# Patient Record
Sex: FEMALE | Race: WHITE | Employment: OTHER | URBAN - METROPOLITAN AREA
[De-identification: names, ages, dates, MRNs, and addresses within clinical notes are randomized per-mention and may not be internally consistent; named-entity substitution may affect disease eponyms.]

---

## 2020-12-30 ENCOUNTER — HOSPITAL ENCOUNTER (INPATIENT)
Age: 75
LOS: 2 days | Discharge: HOME | DRG: 388 | End: 2021-01-01
Attending: INTERNAL MEDICINE | Admitting: INTERNAL MEDICINE
Payer: MEDICARE

## 2020-12-30 DIAGNOSIS — N18.4 CKD (CHRONIC KIDNEY DISEASE), STAGE IV (HCC): Primary | ICD-10-CM

## 2020-12-30 PROBLEM — I50.9 CHF (CONGESTIVE HEART FAILURE) (HCC): Status: ACTIVE | Noted: 2020-12-30

## 2020-12-30 PROBLEM — K56.609 SBO (SMALL BOWEL OBSTRUCTION) (HCC): Status: ACTIVE | Noted: 2020-12-30

## 2020-12-30 PROBLEM — I89.0 LYMPHEDEMA: Status: ACTIVE | Noted: 2018-09-13

## 2020-12-30 PROBLEM — C18.6 MALIGNANT NEOPLASM OF DESCENDING COLON (HCC): Status: ACTIVE | Noted: 2017-01-25

## 2020-12-30 PROBLEM — Z85.038 H/O MALIGNANT NEOPLASM OF COLON: Status: ACTIVE | Noted: 2020-12-30

## 2020-12-30 PROBLEM — I50.22 CHRONIC SYSTOLIC HEART FAILURE (HCC): Status: ACTIVE | Noted: 2017-03-21

## 2020-12-30 PROBLEM — Z93.3 COLOSTOMY PRESENT (HCC): Status: ACTIVE | Noted: 2017-01-25

## 2020-12-30 LAB — GLUCOSE BLD-MCNC: 94 MG/DL (ref 65–105)

## 2020-12-30 PROCEDURE — 1210000000 HC MED SURG R&B

## 2020-12-30 PROCEDURE — 2580000003 HC RX 258: Performed by: NURSE PRACTITIONER

## 2020-12-30 PROCEDURE — 6360000002 HC RX W HCPCS: Performed by: NURSE PRACTITIONER

## 2020-12-30 PROCEDURE — 2500000003 HC RX 250 WO HCPCS: Performed by: NURSE PRACTITIONER

## 2020-12-30 PROCEDURE — 82947 ASSAY GLUCOSE BLOOD QUANT: CPT

## 2020-12-30 RX ORDER — MORPHINE SULFATE 2 MG/ML
2 INJECTION, SOLUTION INTRAMUSCULAR; INTRAVENOUS EVERY 4 HOURS PRN
Status: DISCONTINUED | OUTPATIENT
Start: 2020-12-30 | End: 2021-01-01

## 2020-12-30 RX ORDER — HEPARIN SODIUM 5000 [USP'U]/ML
5000 INJECTION, SOLUTION INTRAVENOUS; SUBCUTANEOUS EVERY 8 HOURS SCHEDULED
Status: DISCONTINUED | OUTPATIENT
Start: 2020-12-30 | End: 2021-01-01 | Stop reason: HOSPADM

## 2020-12-30 RX ORDER — NICOTINE 21 MG/24HR
1 PATCH, TRANSDERMAL 24 HOURS TRANSDERMAL DAILY PRN
Status: DISCONTINUED | OUTPATIENT
Start: 2020-12-30 | End: 2021-01-01 | Stop reason: HOSPADM

## 2020-12-30 RX ORDER — PROMETHAZINE HYDROCHLORIDE 25 MG/1
12.5 TABLET ORAL EVERY 6 HOURS PRN
Status: DISCONTINUED | OUTPATIENT
Start: 2020-12-30 | End: 2021-01-01 | Stop reason: HOSPADM

## 2020-12-30 RX ORDER — DEXTROSE MONOHYDRATE 25 G/50ML
12.5 INJECTION, SOLUTION INTRAVENOUS PRN
Status: DISCONTINUED | OUTPATIENT
Start: 2020-12-30 | End: 2021-01-01 | Stop reason: HOSPADM

## 2020-12-30 RX ORDER — POLYETHYLENE GLYCOL 3350 17 G/17G
17 POWDER, FOR SOLUTION ORAL DAILY PRN
Status: DISCONTINUED | OUTPATIENT
Start: 2020-12-30 | End: 2021-01-01 | Stop reason: HOSPADM

## 2020-12-30 RX ORDER — ONDANSETRON 2 MG/ML
4 INJECTION INTRAMUSCULAR; INTRAVENOUS EVERY 6 HOURS PRN
Status: DISCONTINUED | OUTPATIENT
Start: 2020-12-30 | End: 2021-01-01 | Stop reason: HOSPADM

## 2020-12-30 RX ORDER — POTASSIUM CHLORIDE 7.45 MG/ML
10 INJECTION INTRAVENOUS PRN
Status: DISCONTINUED | OUTPATIENT
Start: 2020-12-30 | End: 2021-01-01 | Stop reason: HOSPADM

## 2020-12-30 RX ORDER — MAGNESIUM SULFATE 1 G/100ML
1 INJECTION INTRAVENOUS PRN
Status: DISCONTINUED | OUTPATIENT
Start: 2020-12-30 | End: 2021-01-01 | Stop reason: HOSPADM

## 2020-12-30 RX ORDER — POTASSIUM CHLORIDE 20 MEQ/1
40 TABLET, EXTENDED RELEASE ORAL PRN
Status: DISCONTINUED | OUTPATIENT
Start: 2020-12-30 | End: 2021-01-01 | Stop reason: HOSPADM

## 2020-12-30 RX ORDER — NICOTINE POLACRILEX 4 MG
15 LOZENGE BUCCAL PRN
Status: DISCONTINUED | OUTPATIENT
Start: 2020-12-30 | End: 2021-01-01 | Stop reason: HOSPADM

## 2020-12-30 RX ORDER — SODIUM CHLORIDE 0.9 % (FLUSH) 0.9 %
10 SYRINGE (ML) INJECTION EVERY 12 HOURS SCHEDULED
Status: DISCONTINUED | OUTPATIENT
Start: 2020-12-30 | End: 2021-01-01 | Stop reason: HOSPADM

## 2020-12-30 RX ORDER — SODIUM CHLORIDE 0.9 % (FLUSH) 0.9 %
10 SYRINGE (ML) INJECTION PRN
Status: DISCONTINUED | OUTPATIENT
Start: 2020-12-30 | End: 2021-01-01 | Stop reason: HOSPADM

## 2020-12-30 RX ORDER — DEXTROSE MONOHYDRATE 50 MG/ML
100 INJECTION, SOLUTION INTRAVENOUS PRN
Status: DISCONTINUED | OUTPATIENT
Start: 2020-12-30 | End: 2021-01-01 | Stop reason: HOSPADM

## 2020-12-30 RX ORDER — SODIUM CHLORIDE 9 MG/ML
INJECTION, SOLUTION INTRAVENOUS CONTINUOUS
Status: DISCONTINUED | OUTPATIENT
Start: 2020-12-30 | End: 2020-12-31

## 2020-12-30 RX ADMIN — SODIUM CHLORIDE: 9 INJECTION, SOLUTION INTRAVENOUS at 20:28

## 2020-12-30 RX ADMIN — HEPARIN SODIUM 5000 UNITS: 5000 INJECTION INTRAVENOUS; SUBCUTANEOUS at 20:27

## 2020-12-30 RX ADMIN — FAMOTIDINE 20 MG: 10 INJECTION, SOLUTION INTRAVENOUS at 20:27

## 2020-12-30 RX ADMIN — Medication 10 ML: at 20:28

## 2020-12-31 ENCOUNTER — APPOINTMENT (OUTPATIENT)
Dept: GENERAL RADIOLOGY | Age: 75
DRG: 388 | End: 2020-12-31
Attending: INTERNAL MEDICINE
Payer: MEDICARE

## 2020-12-31 ENCOUNTER — APPOINTMENT (OUTPATIENT)
Dept: ULTRASOUND IMAGING | Age: 75
DRG: 388 | End: 2020-12-31
Attending: INTERNAL MEDICINE
Payer: MEDICARE

## 2020-12-31 LAB
ALBUMIN SERPL-MCNC: 3.1 G/DL (ref 3.5–5.2)
ALBUMIN/GLOBULIN RATIO: 1 (ref 1–2.5)
ALP BLD-CCNC: 87 U/L (ref 35–104)
ALT SERPL-CCNC: 61 U/L (ref 5–33)
AMYLASE: 73 U/L (ref 28–100)
ANION GAP SERPL CALCULATED.3IONS-SCNC: 12 MMOL/L (ref 9–17)
AST SERPL-CCNC: 74 U/L
BILIRUB SERPL-MCNC: 0.33 MG/DL (ref 0.3–1.2)
BNP INTERPRETATION: ABNORMAL
BUN BLDV-MCNC: 59 MG/DL (ref 8–23)
BUN/CREAT BLD: ABNORMAL (ref 9–20)
CALCIUM SERPL-MCNC: 9.2 MG/DL (ref 8.6–10.4)
CHLORIDE BLD-SCNC: 113 MMOL/L (ref 98–107)
CO2: 19 MMOL/L (ref 20–31)
CREAT SERPL-MCNC: 3 MG/DL (ref 0.5–0.9)
CREATININE URINE: 129.6 MG/DL (ref 28–217)
GFR AFRICAN AMERICAN: 18 ML/MIN
GFR NON-AFRICAN AMERICAN: 15 ML/MIN
GFR SERPL CREATININE-BSD FRML MDRD: ABNORMAL ML/MIN/{1.73_M2}
GFR SERPL CREATININE-BSD FRML MDRD: ABNORMAL ML/MIN/{1.73_M2}
GLUCOSE BLD-MCNC: 114 MG/DL (ref 65–105)
GLUCOSE BLD-MCNC: 119 MG/DL (ref 65–105)
GLUCOSE BLD-MCNC: 73 MG/DL (ref 65–105)
GLUCOSE BLD-MCNC: 82 MG/DL (ref 65–105)
GLUCOSE BLD-MCNC: 83 MG/DL (ref 70–99)
HCT VFR BLD CALC: 36.7 % (ref 36–46)
HEMOGLOBIN: 10.7 G/DL (ref 12–16)
LIPASE: 28 U/L (ref 13–60)
LV EF: 28 %
LVEF MODALITY: NORMAL
MAGNESIUM: 2.3 MG/DL (ref 1.6–2.6)
MCH RBC QN AUTO: 29.6 PG (ref 26–34)
MCHC RBC AUTO-ENTMCNC: 29.2 G/DL (ref 31–37)
MCV RBC AUTO: 101.4 FL (ref 80–100)
NRBC AUTOMATED: ABNORMAL PER 100 WBC
PDW BLD-RTO: 16.6 % (ref 12.5–15.4)
PHOSPHORUS: 4.3 MG/DL (ref 2.6–4.5)
PLATELET # BLD: 192 K/UL (ref 140–450)
PMV BLD AUTO: 9.3 FL (ref 8–14)
POTASSIUM SERPL-SCNC: 4.8 MMOL/L (ref 3.7–5.3)
PRO-BNP: ABNORMAL PG/ML
RBC # BLD: 3.62 M/UL (ref 4–5.2)
SODIUM BLD-SCNC: 144 MMOL/L (ref 135–144)
SODIUM,UR: 46 MMOL/L
TOTAL PROTEIN, URINE: 66 MG/DL
TOTAL PROTEIN: 6.1 G/DL (ref 6.4–8.3)
WBC # BLD: 6.3 K/UL (ref 3.5–11)

## 2020-12-31 PROCEDURE — APPSS180 APP SPLIT SHARED TIME > 60 MINUTES: Performed by: NURSE PRACTITIONER

## 2020-12-31 PROCEDURE — 84156 ASSAY OF PROTEIN URINE: CPT

## 2020-12-31 PROCEDURE — 6360000002 HC RX W HCPCS: Performed by: NURSE PRACTITIONER

## 2020-12-31 PROCEDURE — 93005 ELECTROCARDIOGRAM TRACING: CPT | Performed by: NURSE PRACTITIONER

## 2020-12-31 PROCEDURE — 84100 ASSAY OF PHOSPHORUS: CPT

## 2020-12-31 PROCEDURE — 83880 ASSAY OF NATRIURETIC PEPTIDE: CPT

## 2020-12-31 PROCEDURE — 36415 COLL VENOUS BLD VENIPUNCTURE: CPT

## 2020-12-31 PROCEDURE — 97535 SELF CARE MNGMENT TRAINING: CPT

## 2020-12-31 PROCEDURE — 2580000003 HC RX 258: Performed by: INTERNAL MEDICINE

## 2020-12-31 PROCEDURE — 85027 COMPLETE CBC AUTOMATED: CPT

## 2020-12-31 PROCEDURE — 97116 GAIT TRAINING THERAPY: CPT

## 2020-12-31 PROCEDURE — 82570 ASSAY OF URINE CREATININE: CPT

## 2020-12-31 PROCEDURE — 99222 1ST HOSP IP/OBS MODERATE 55: CPT | Performed by: INTERNAL MEDICINE

## 2020-12-31 PROCEDURE — 1210000000 HC MED SURG R&B

## 2020-12-31 PROCEDURE — 97162 PT EVAL MOD COMPLEX 30 MIN: CPT

## 2020-12-31 PROCEDURE — 80053 COMPREHEN METABOLIC PANEL: CPT

## 2020-12-31 PROCEDURE — 82947 ASSAY GLUCOSE BLOOD QUANT: CPT

## 2020-12-31 PROCEDURE — 2500000003 HC RX 250 WO HCPCS: Performed by: NURSE PRACTITIONER

## 2020-12-31 PROCEDURE — 2580000003 HC RX 258: Performed by: NURSE PRACTITIONER

## 2020-12-31 PROCEDURE — 83735 ASSAY OF MAGNESIUM: CPT

## 2020-12-31 PROCEDURE — 74018 RADEX ABDOMEN 1 VIEW: CPT

## 2020-12-31 PROCEDURE — 82150 ASSAY OF AMYLASE: CPT

## 2020-12-31 PROCEDURE — 99223 1ST HOSP IP/OBS HIGH 75: CPT | Performed by: INTERNAL MEDICINE

## 2020-12-31 PROCEDURE — 93306 TTE W/DOPPLER COMPLETE: CPT

## 2020-12-31 PROCEDURE — 83690 ASSAY OF LIPASE: CPT

## 2020-12-31 PROCEDURE — 84300 ASSAY OF URINE SODIUM: CPT

## 2020-12-31 PROCEDURE — 76770 US EXAM ABDO BACK WALL COMP: CPT

## 2020-12-31 PROCEDURE — 6370000000 HC RX 637 (ALT 250 FOR IP): Performed by: INTERNAL MEDICINE

## 2020-12-31 PROCEDURE — 97166 OT EVAL MOD COMPLEX 45 MIN: CPT

## 2020-12-31 RX ORDER — MIDODRINE HYDROCHLORIDE 2.5 MG/1
2.5 TABLET ORAL
Status: DISCONTINUED | OUTPATIENT
Start: 2020-12-31 | End: 2021-01-01 | Stop reason: HOSPADM

## 2020-12-31 RX ORDER — ACETAMINOPHEN 500 MG
1000 TABLET ORAL EVERY 6 HOURS PRN
COMMUNITY

## 2020-12-31 RX ORDER — SODIUM CHLORIDE 450 MG/100ML
INJECTION, SOLUTION INTRAVENOUS CONTINUOUS
Status: DISCONTINUED | OUTPATIENT
Start: 2020-12-31 | End: 2020-12-31

## 2020-12-31 RX ORDER — SODIUM CHLORIDE 450 MG/100ML
INJECTION, SOLUTION INTRAVENOUS CONTINUOUS
Status: DISCONTINUED | OUTPATIENT
Start: 2020-12-31 | End: 2021-01-01

## 2020-12-31 RX ORDER — FLUOXETINE 10 MG/1
10 CAPSULE ORAL DAILY
COMMUNITY

## 2020-12-31 RX ADMIN — HEPARIN SODIUM 5000 UNITS: 5000 INJECTION INTRAVENOUS; SUBCUTANEOUS at 21:18

## 2020-12-31 RX ADMIN — Medication 10 ML: at 08:45

## 2020-12-31 RX ADMIN — MIDODRINE HYDROCHLORIDE 2.5 MG: 2.5 TABLET ORAL at 21:19

## 2020-12-31 RX ADMIN — Medication 10 ML: at 21:19

## 2020-12-31 RX ADMIN — FAMOTIDINE 20 MG: 10 INJECTION, SOLUTION INTRAVENOUS at 08:45

## 2020-12-31 RX ADMIN — HEPARIN SODIUM 5000 UNITS: 5000 INJECTION INTRAVENOUS; SUBCUTANEOUS at 12:42

## 2020-12-31 RX ADMIN — SODIUM CHLORIDE: 4.5 INJECTION, SOLUTION INTRAVENOUS at 17:50

## 2020-12-31 RX ADMIN — CEFTRIAXONE SODIUM 1 G: 1 INJECTION, POWDER, FOR SOLUTION INTRAMUSCULAR; INTRAVENOUS at 12:42

## 2020-12-31 RX ADMIN — HEPARIN SODIUM 5000 UNITS: 5000 INJECTION INTRAVENOUS; SUBCUTANEOUS at 05:45

## 2020-12-31 ASSESSMENT — PAIN SCALES - GENERAL: PAINLEVEL_OUTOF10: 0

## 2020-12-31 NOTE — PROGRESS NOTES
Occupational Therapy   Occupational Therapy Initial Assessment  Date: 2020   Patient Name: Aidan Nolan  MRN: [id number]     : 1945    Date of Service: 2020    Discharge Recommendations:  Patient would benefit from continued therapy after discharge       Assessment   Performance deficits / Impairments: Decreased functional mobility ; Decreased safe awareness;Decreased balance;Decreased ADL status; Decreased endurance  Treatment Diagnosis: impaired self care status  Prognosis: Good  Decision Making: Low Complexity  OT Education: OT Role;Plan of Care;ADL Adaptive Strategies;Transfer Training;Equipment;Orientation  Patient Education: safety, activity promotion, ADL participation  REQUIRES OT FOLLOW UP: Yes  Activity Tolerance  Activity Tolerance: Patient Tolerated treatment well  Safety Devices  Safety Devices in place: Yes  Type of devices: Call light within reach; Chair alarm in place; Left in chair;Gait belt;Nurse notified           Patient Diagnosis(es): There were no encounter diagnoses. has a past medical history of Abdominal pain, unspecified site, Back pain, Cancer ([address] Chronic kidney disease, Diabetes ([address] Edema, Hypertension, Nocturia, and SOB (shortness of breath). has a past surgical history that includes Colonoscopy and Upper gastrointestinal endoscopy.     Treatment Diagnosis: impaired self care status      Restrictions  Restrictions/Precautions  Restrictions/Precautions: General Precautions  Implants present? : (pt denies)  Position Activity Restriction  Other position/activity restrictions: up as tolerated    Subjective   General  Chart Reviewed: Orders, Progress Notes, History and Physical  Patient assessed for rehabilitation services?: Yes  Referring Practitioner: PAVAN Epps CNP  Diagnosis: SBO  Subjective  Subjective: \"my back is so stiff from being in the bed\"  General Comment Comments: RN ok'd OT/PT Evaluation this date. Patient pleasant and agreeable to participate  Patient Currently in Pain: No  Pain Assessment  Pain Assessment: 0-10  Pain Level: 0  Vital Signs  Patient Currently in Pain: No  Social/Functional History  Social/Functional History  Lives With: Spouse(2 grandkids)  Type of Home: House  Home Layout: One level  Home Access: Ramped entrance  Bathroom Shower/Tub: Tub/Shower unit  Bathroom Toilet: Standard  Bathroom Equipment: Shower chair  Home Equipment: Kate Brisker, 4 wheeled walker  ADL Assistance: Needs assistance(spouse assists with shower transfer, washing back and hair; spouse assists with socks/shoes, otherwise independent)  Homemaking Assistance: Independent(cooking/cleaning/laundry- shares responsibilites with 23 yr old grandkid)  Ambulation Assistance: Independent(no device)  Transfer Assistance: Independent(no device)  Active : No  Mode of Transportation: SUV  IADL Comments: Sleeps in a recliner chair  Additional Comments: Spouse is home 24/7 and able to assist as needed. Has helpful grandkids - 8 yr old and 23 yr old who assist as needed       Objective   Vision: Within Functional Limits  Hearing: Within functional limits    Orientation  Overall Orientation Status: Within Normal Limits  Orientation Level: Oriented X4     Balance  Sitting Balance: Stand by assistance  Standing Balance: Minimal assistance  Functional Mobility  Functional - Mobility Device: Rolling Walker  Activity: To/From therapy gym; Other(hallway mobility)  Assist Level: Contact guard assistance  Functional Mobility Comments: with rolling walker, visibly became shaky after mobility in hallway and then in bathroom, reports from fatigue  Toilet Transfers  Toilet - Technique: Ambulating(with rolling walker)  Equipment Used: Standard toilet(grab bar on R side)  Toilet Transfer: Contact guard assistance Toilet Transfers Comments: verbal cues to use grab bar instead of walker to pull up from  ADL  Feeding: Setup  Grooming: Setup;Contact guard assistance(hand hygiene after toileting)  UE Bathing: Minimal assistance  LE Bathing: Minimal assistance  UE Dressing: Stand by assistance  LE Dressing: Moderate assistance;Minimal assistance(assist with hospital footies and TEDs this date)  Toileting: Minimal assistance; Moderate assistance(assist with toilet transfer and management of brief)  Tone RUE  RUE Tone: Normotonic  Tone LUE  LUE Tone: Normotonic  Coordination  Movements Are Fluid And Coordinated: Yes  Coordination and Movement description: Tremors     Bed mobility  Supine to Sit: Stand by assistance  Sit to Supine: Unable to assess  Scooting: Stand by assistance  Comment: reports sleeping in a recliner chair at home  Transfers  Sit to stand: Contact guard assistance  Stand to sit: Contact guard assistance  Transfer Comments: from edge of bed     Cognition  Overall Cognitive Status: Exceptions  Safety Judgement: Decreased awareness of need for safety        Sensation  Overall Sensation Status: WFL        LUE AROM (degrees)  LUE AROM : WFL  Left Hand AROM (degrees)  Left Hand AROM: WFL  RUE AROM (degrees)  RUE AROM : WFL  Right Hand AROM (degrees)  Right Hand AROM: WFL  LUE Strength  LUE Strength Comment: grossly 4-/5  RUE Strength  RUE Strength Comment: grossly 4-/5                   Plan   Plan  Times per week: 5-6x/wk  Current Treatment Recommendations: Strengthening, Endurance Training, Patient/Caregiver Education & Training, Equipment Evaluation, Education, & procurement, Self-Care / ADL, Balance Training, Functional Mobility Training, Safety Education & Training      AM-PAC Score        AM-PAC Inpatient Daily Activity Raw Score: 18 (12/31/20 1334)  AM-PAC Inpatient ADL T-Scale Score : 38.66 (12/31/20 1334)  ADL Inpatient CMS 0-100% Score: 46.65 (12/31/20 1334) ADL Inpatient CMS G-Code Modifier : CK (12/31/20 6786)    Goals  Short term goals  Time Frame for Short term goals: in 14 visits, patient will  Short term goal 1: perform functional transfers/mobility during ADL routine with Modified [place] using rolling walker with Good safety  Short term goal 2: increase standing tolerance to 4-5 minutes with UE support as needed during functional task  Short term goal 3: perform BADL tasks with independence/modified independence       Therapy Time   Individual Concurrent Group Co-treatment   Time In 1047         Time Out 1108         Minutes 21         Timed Code Treatment Minutes: 8 Minutes Co Luciano w/ PT       Judith Howard, OT

## 2020-12-31 NOTE — PROGRESS NOTES
Pharmacy Note     Renal Dose Adjustment    Julius Knight is a 66 y.o. female. Pharmacist assessment of renally cleared medications. Recent Labs     12/31/20  0544   BUN 59*       Recent Labs     12/31/20  0544   CREATININE 3.00*       Estimated Creatinine Clearance: 14 mL/min (A) (based on SCr of 3 mg/dL (H)). Estimated CrCl using Ideal Body Weight: 11.3 mL/min (based on IBW 45.5 kg)    Height:   Ht Readings from Last 1 Encounters:   12/30/20 5' (1.524 m)     Weight:  Wt Readings from Last 1 Encounters:   12/31/20 160 lb 11.5 oz (72.9 kg)       The following medication dose has been adjusted based upon renal function per P&T Guidelines:           Order for famotidine 20 mg twice daily changed to famotidine 20 mg once daily.     Fernadna Yoon, PharmD 12/31/2020 8:57 AM

## 2020-12-31 NOTE — H&P
[facility]  Office: [address], DO, Bronwyn Watters, DO, Jaclyn Cisneros, DO, Jakob Harris, DO, Davide Love MD, Pablito Schwartz MD, Lilliana Barlow MD, Sameer Bueno MD, Marquita Cunningham MD, Fay Mei MD, April Mccord MD, Alexia Pedroza MD, Mbonu Mable Nyhan, MD, Gena Scott DO, Soren Pinzon MD, James Reddy MD, Alejandro Carballo DO, Terri Lopez MD,  Ascencion Ray DO, Kb Tracy MD, Harry Guerrero MD, Saúl Muir, [facility], [facility], CNP, Nikita Camargo, CNP, Veronica Estrada, CNS, Nahomy Rand, CNP, Baron Ahumada, CNP, Sonam Stark, CNP, Rosa Elena Chu, CNP, Melanie Duvall, CNP, Dalia Patten PA-C, Jodie Castellon, [facility], Eusebio Castaneda, CNP, Dav Doe, CNP, Kit Gibbs, CNP, Armani Fonseca, CNP, Tony Lee, [facility]   [address] [facility]    HISTORY AND PHYSICAL EXAMINATION            Date:   12/31/2020  Patient name:  Clive Paul  Date of admission:  12/30/2020  5:59 PM  MRN:   [id number]  Account:  [de-identified]  YOB: 1945  PCP:    Jaime Roca MD  Room:   [address] [place] [address]  Code Status:    Full Code    Chief Complaint:     Abdominal pain    History Obtained From:     patient    History of Present Illness:     Clive Paul is a 66 y.o. Unavailable/unknown female who presents with No chief complaint on file. and is admitted to the hospital for the management of SBO (small bowel obstruction) ([facility] Patient presented to [facility] ER related to abdominal pain/cramping nausea vomiting, fever, chills, mild shortness of breath, and decreased activity has been ongoing since about Tuesday. 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 She states typically her stool is really runny but over the last couple days has been thicker but no change in color. She also reports following with Dr. Kim Thomas, with nephrology but hasn't seen him since 2017. She states when she had her initial surgery to remove the cancerous areas in her abdomen she had to have a revision surgery in the colectomy. She is followed with Dr. Bridgett Estrella from [place] [address] for that. Her creatinine was initially 2.2 this morning BUN and creatinine are 59/3.00. I consulted the nephrology group. BNP 11,000 379. Hemoglobin and hematocrit are stable for her. According to care everywhere her normal creatinine is around 2. Her other history includes CHF with an EF reported around 25%. CKD stage IV, dyspnea, essential hypertension, lymphedema, and type 2 diabetes. Dr. Lucía Matos was asked to see the patient related to possible small bowel obstruction per CT. In his opinion she does not have a surgical abdomen. Patient does not require emergent surgery but he would suggest she follow-up with Dr. Bridgett Estrella outpatient for evaluation of parastomal hernia. CT completed at [facility] revealed A large right-sided parastomal hernia is present causing a partial small bowel obstruction. Margaretann Waters is some stool within the small bowel in the region of the hernia.  Inflammation is present around the hernia. This is completed at [facility] was positive for nitrates and 3+ bacteria. Patient was started on Rocephin. Patient denies urinary symptoms. During my assessment she denies nausea and vomiting and is looking forward to having something to eat. Echo ordered prior to Dr. Eartha Boast evaluating patient. Because of her low EF I was concerned about her risk for surgery. According to the echo she has mild left ventricular hypertrophy. Severe global hypokinesis. Global left ventricular function is moderately to severely reduced with an estimated ejection fraction of 25 to 30%. Grade 1 mild left ventricular diastolic dysfunction. No severe valvular disease noted. Past Medical History:     Past Medical History:   Diagnosis Date    Abdominal pain, unspecified site     Back pain     Cancer ([facility]     colon    Chronic kidney disease     [facility]     Edema     Hypertension     Nocturia     SOB (shortness of breath)         Past Surgical History:     Past Surgical History:   Procedure Laterality Date    COLONOSCOPY      UPPER GASTROINTESTINAL ENDOSCOPY          Medications Prior to Admission:     Prior to Admission medications    Medication Sig Start Date End Date Taking?  Authorizing Provider   sacubitril-valsartan (ENTRESTO) 24-26 MG per tablet Take 1 tablet by mouth 2 times daily   Yes Historical Provider, MD   aspirin 81 MG tablet Take 81 mg by mouth daily   Yes Historical Provider, MD   buPROPion (WELLBUTRIN XL) 150 MG extended release tablet Take 150 mg by mouth every morning   Yes Historical Provider, MD   carvedilol (COREG) 3.125 MG tablet Take 3.125 mg by mouth 2 times daily (with meals)   Yes Historical Provider, MD   furosemide (LASIX) 20 MG tablet Take 20 mg by mouth 2 times daily   Yes Historical Provider, MD   glimepiride (AMARYL) 2 MG tablet Take 2 mg by mouth every morning (before breakfast)   Yes Historical Provider, MD   Ferrous Fumarate 325 (106 FE) MG TABS Take 325 mg by mouth 2 times daily   Yes Historical Provider, MD   atorvastatin (LIPITOR) 40 MG tablet Take 40 mg by mouth daily   Yes Historical Provider, MD Potassium Chloride Jill CR (KLOR-CON M20 PO) Take by mouth    Historical Provider, MD        Allergies:     Oxycodone hcl and Tylenol with codeine #3 [acetaminophen-codeine]    Social History:     Tobacco:    reports that she has quit smoking. She has never used smokeless tobacco.  Alcohol:      reports no history of alcohol use. Drug Use:  reports no history of drug use. Family History:     Family History   Problem Relation Age of Onset    Other Mother         cva    Other Father         cva       Review of Systems:     Positive and Negative as described in HPI. Review of Systems   Constitutional: Positive for activity change, appetite change, chills and fever. Negative for diaphoresis. HENT: Negative for congestion and hearing loss. Respiratory: Negative for cough, shortness of breath, wheezing and stridor. Cardiovascular: Negative for chest pain, palpitations and leg swelling. Gastrointestinal: Positive for abdominal pain, nausea and vomiting. Negative for blood in stool, constipation and diarrhea. Genitourinary: Negative for dysuria and frequency. Musculoskeletal: Negative for myalgias. Skin: Negative for rash. Neurological: Negative for dizziness, seizures and headaches. Psychiatric/Behavioral: The patient is not nervous/anxious. Physical Exam:   BP (!) 98/53   Pulse 69   Temp 98.2 °F (36.8 °C) (Oral)   Resp 18   Ht 5' (1.524 m)   Wt 160 lb 11.5 oz (72.9 kg)   SpO2 95%   BMI 31.39 kg/m²   Temp (24hrs), Av °F (36.7 °C), Min:97.6 °F (36.4 °C), Max:98.5 °F (36.9 °C)    Recent Labs     20  0727   POCGLU 94 73       Intake/Output Summary (Last 24 hours) at 2020 0832  Last data filed at 2020 0650  Gross per 24 hour   Intake 467.5 ml   Output 225 ml   Net 242.5 ml       Physical Exam  Vitals signs and nursing note reviewed. Constitutional:       Appearance: Normal appearance.    HENT:      Mouth/Throat: Mouth: Mucous membranes are moist.   Eyes:      Extraocular Movements: Extraocular movements intact. Neck:      Musculoskeletal: Neck supple. Cardiovascular:      Rate and Rhythm: Normal rate and regular rhythm. Pulses: Normal pulses. Heart sounds: Normal heart sounds. Pulmonary:      Effort: Pulmonary effort is normal.      Breath sounds: Examination of the right-upper field reveals decreased breath sounds. Examination of the left-upper field reveals decreased breath sounds. Examination of the right-middle field reveals decreased breath sounds. Examination of the left-middle field reveals decreased breath sounds. Examination of the right-lower field reveals decreased breath sounds. Examination of the left-lower field reveals decreased breath sounds. Decreased breath sounds present. Abdominal:      General: Bowel sounds are normal.      Palpations: Abdomen is soft. Tenderness: There is no abdominal tenderness. Musculoskeletal:      Right lower le+ Edema present. Left lower le+ Edema present. Skin:     General: Skin is warm and dry. Capillary Refill: Capillary refill takes less than 2 seconds. Neurological:      Mental Status: She is alert and oriented to person, place, and time.    Psychiatric:         Mood and Affect: Mood normal.         Investigations:      Laboratory Testing:  Recent Results (from the past 24 hour(s))   POC Glucose Fingerstick    Collection Time: 20  8:53 PM   Result Value Ref Range    POC Glucose 94 65 - 105 mg/dL   Comprehensive Metabolic Panel w/ Reflex to MG    Collection Time: 20  5:44 AM   Result Value Ref Range    Glucose 83 70 - 99 mg/dL    BUN 59 (H) 8 - 23 mg/dL    CREATININE 3.00 (H) 0.50 - 0.90 mg/dL    Bun/Cre Ratio NOT REPORTED 9 - 20    Calcium 9.2 8.6 - 10.4 mg/dL    Sodium 144 135 - 144 mmol/L    Potassium 4.8 3.7 - 5.3 mmol/L    Chloride 113 (H) 98 - 107 mmol/L    CO2 19 (L) 20 - 31 mmol/L Anion Gap 12 9 - 17 mmol/L    Alkaline Phosphatase 87 35 - 104 U/L    ALT 61 (H) 5 - 33 U/L    AST 74 (H) <32 U/L    Total Bilirubin 0.33 0.3 - 1.2 mg/dL    Total Protein 6.1 (L) 6.4 - 8.3 g/dL    Alb 3.1 (L) 3.5 - 5.2 g/dL    Albumin/Globulin Ratio 1.0 1.0 - 2.5    GFR Non-African American 15 (L) >60 mL/min    GFR  18 (L) >60 mL/min    GFR Comment          GFR Staging NOT REPORTED    Magnesium    Collection Time: 12/31/20  5:44 AM   Result Value Ref Range    Magnesium 2.3 1.6 - 2.6 mg/dL   Phosphorus    Collection Time: 12/31/20  5:44 AM   Result Value Ref Range    Phosphorus 4.3 2.6 - 4.5 mg/dL   Amylase    Collection Time: 12/31/20  5:44 AM   Result Value Ref Range    Amylase 73 28 - 100 U/L   Lipase    Collection Time: 12/31/20  5:44 AM   Result Value Ref Range    Lipase 28 13 - 60 U/L   CBC    Collection Time: 12/31/20  5:44 AM   Result Value Ref Range    WBC 6.3 3.5 - 11.0 k/uL    RBC 3.62 (L) 4.0 - 5.2 m/uL    Hemoglobin 10.7 (L) 12.0 - 16.0 g/dL    Hematocrit 36.7 36 - 46 %    .4 (H) 80 - 100 fL    MCH 29.6 26 - 34 pg    MCHC 29.2 (L) 31 - 37 g/dL    RDW 16.6 (H) 12.5 - 15.4 %    Platelets 484 277 - 176 k/uL    MPV 9.3 8.0 - 14.0 fL    NRBC Automated NOT REPORTED per 100 WBC   Brain Natriuretic Peptide    Collection Time: 12/31/20  5:44 AM   Result Value Ref Range    Pro-BNP 11,379 (H) <300 pg/mL    BNP Interpretation Pro-BNP Reference Range:    EKG 12 Lead    Collection Time: 12/31/20  5:55 AM   Result Value Ref Range    Ventricular Rate 63 BPM    Atrial Rate 63 BPM    P-R Interval 158 ms    QRS Duration 150 ms    Q-T Interval 568 ms    QTc Calculation (Bazett) 581 ms    P Axis 43 degrees    R Axis -3 degrees    T Axis -167 degrees   POC Glucose Fingerstick    Collection Time: 12/31/20  7:27 AM   Result Value Ref Range    POC Glucose 73 65 - 105 mg/dL       Imaging/Diagnostics:    Xr Abdomen (kub) (single Ap View)    Result Date: 12/31/2020 Few dilated loops of small bowel in the mid right abdomen with relative paucity of bowel gas. Underlying ileus or obstruction should be considered in the appropriate clinical setting. Consider further evaluation with CT imaging. 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 Fall 2016 (Primary Dx);            CHF (congestive heart failure) ([facility] 12/30/2020 Yes    Overview Signed 12/30/2020  7:17 PM by PAVAN Agrawal CNP     Chronic systolic heart failure EF 25-30%         CKD (chronic kidney disease), stage IV ([facility] [address] [facility] 12/30/2020 Yes    Colostomy present ([facility] 12/30/2020 Yes    Dyspnea 12/30/2020 Yes    Essential hypertension 12/30/2020 Yes    Hyperlipidemia 12/30/2020 Yes    Lower urinary tract infectious disease 12/30/2020 Yes    Lymphedema 12/30/2020 Yes    Obesity 12/30/2020 Yes    Type II diabetes mellitus ([facility] 12/30/2020 Yes          Plan:     Patient status inpatient in the Med/Surge    Small bowel obstruction; general surgery following. Starting clear liquids. Monitoring for stool/flatus. If no nausea and vomiting advance diet as tolerated. Check KUB in a.m. pain medications and antiemetics as needed. PPI    Rocephin for UTI    CKD4 with elevated creatinine on admission; continue gentle IV hydration for now. Nephrology consulted. Monitor I&O. Urine for sodium, creatinine, and protein. Repeat BMP and CBC tomorrow. Renal ultrasound unremarkable    History of diastolic CHF and chronic dyspnea; repeat echo see no change from previous reports. Monitor BNP. Nephrology consulted to help with fluid management. Restart Entresto when tolerating p.o. History of type 2 diabetes; start insulin sliding scale for glucose management. Resume oral medications when able    History of obesity to be managed outpatient by PCP    History of colostomy; monitor output. Skin care as needed    PT/OT to evaluate and treat    History of lymphedema; apply bilateral LEXY hose    Heparin for DVT prophylaxis    Consultations:   IP CONSULT TO GENERAL SURGERY     Patient is admitted as inpatient status because of co-morbidities listed above, severity of signs and symptoms as outlined, requirement for current medical therapies and most importantly because of direct risk to patient if care not provided in a hospital setting. Expected length of stay > 48 hours.     PAVAN Figueroa - CNP  12/31/2020  8:32 AM    Copy sent to Dr. Sean Best MD

## 2020-12-31 NOTE — CARE COORDINATION
Case Management Initial Discharge Plan  Jennifer Garcia             Met with:patient to discuss discharge plans. Information verified: address, contacts, phone number, , insurance Yes    Emergency Contact/Next of Kin name & number: [phone] [facility] [phone]    PCP: Tonia Tomas MD  Date of last visit: past year    Insurance Provider: [employer]    Discharge Planning    Living Arrangements:  Spouse/Significant Other, Family Members   Support Systems:  Spouse/Significant Other, Children, Family Members    Home has 1 stories  ramp stairs to climb to get into front doorLocation of bedroom/bathroom in home 1    Patient able to perform ADL's:Independent    Current Services (outpatient & in home) none  DME equipment: has cane, walker, WC, glucometer  DME provider:     Receiving oral anticoagulation therapy? No    If indicated:   Physician managing anticoagulation treatment:   Where does patient obtain lab work for ATC treatment? Potential Assistance Needed:  N/A    Patient agreeable to home care: No  [place] of choice provided:  no    Prior SNF/Rehab Placement and Facility: [place] [id number] to SNF/Rehab: No  [place] of choice provided: no     Evaluation: no    Expected Discharge date:  21    Patient expects to be discharged to:  Home  Follow Up Appointment: Best Day/ Time:      Transportation provider:   Transportation arrangements needed for discharge: No    Readmission Risk              Risk of Unplanned Readmission:        18             Does patient have a readmission risk score greater than 14?: Yes  If yes, follow-up appointment must be made within 7 days of discharge.      Goals of Care: monitor renal function      Discharge Plan: home with spouse          Electronically signed by Desi Samuel RN on 20 at 3:57 PM EST

## 2020-12-31 NOTE — PROGRESS NOTES
Pt stool is thicker this AM and pt states that she is feeling much better. Pt is now requesting to have something to drink.

## 2020-12-31 NOTE — PROGRESS NOTES
Physical Therapy    Facility/Department: [facility] MED SURG ICU  Initial Assessment    NAME: Oswald Clark  : 1945  MRN: [id number]    Date of Service: 2020    Discharge Recommendations:  Continue to assess pending progress   PT Equipment Recommendations  Equipment Needed: No    Assessment   Body structures, Functions, Activity limitations: Decreased functional mobility ; Decreased balance;Decreased endurance;Decreased strength  Assessment: Pt presents with generalized weakness and decreased activity tolerance. Pt ambulated with RW with ' x 1; transfers CGA. Pt has support at home via spouse and will most likely be able to discharge home. Will assess need for therapy upon discharge pending further progress. Treatment Diagnosis: dec mobililty  Prognosis: Good  Decision Making: Medium Complexity  PT Education: Goals;Transfer Training;PT Role;Plan of Care;General Safety;Gait Training;Functional Mobility Training  REQUIRES PT FOLLOW UP: Yes  Activity Tolerance  Activity Tolerance: Patient Tolerated treatment well       Patient Diagnosis(es): There were no encounter diagnoses. has a past medical history of Abdominal pain, unspecified site, Back pain, Cancer ([address] Chronic kidney disease, Diabetes ([address] Edema, Hypertension, Nocturia, and SOB (shortness of breath). has a past surgical history that includes Colonoscopy and Upper gastrointestinal endoscopy. Restrictions  Restrictions/Precautions  Restrictions/Precautions: General Precautions  Implants present? : (pt denies)  Position Activity Restriction  Other position/activity restrictions: up as tolerated  Vision/Hearing        Subjective  General  Patient assessed for rehabilitation services?: Yes  Family / Caregiver Present: No  Referring Practitioner: Bruno  Referral Date : 20  Diagnosis: SBO  Follows Commands: Within Functional Limits  Subjective  Subjective: Pt supine in bed and agreeable to therapy. Pt with no c/o pain. Pain Screening  Patient Currently in Pain: No  Pain Assessment  Pain Assessment: 0-10  Pain Level: 0  Vital Signs  Patient Currently in Pain: No       Orientation  Orientation  Overall Orientation Status: Within Normal Limits  Social/Functional History  Social/Functional History  Lives With: Spouse(2 grandkids)  Type of Home: House  Home Layout: One level  Home Access: Ramped entrance  Bathroom Shower/Tub: Tub/Shower unit  Bathroom Toilet: Standard  Bathroom Equipment: Shower chair  Home Equipment: [employer], 4 wheeled walker  ADL Assistance: Needs assistance(spouse assists with shower transfer, washing back and hair; spouse assists with socks/shoes, otherwise independent)  Homemaking Assistance: Independent(cooking/cleaning/laundry- shares responsibilites with 23 yr old grandkid)  Ambulation Assistance: Independent(no device)  Transfer Assistance: Independent(no device)  Active : No  Mode of Transportation: [facility]  IADL Comments: Sleeps in a recliner chair  Additional Comments: Spouse is home 24/7 and able to assist as needed. Has helpful grandkids - 8 yr old and 23 yr old who assist as needed  Cognition        Objective          AROM RLE (degrees)  RLE AROM: WFL  AROM LLE (degrees)  LLE AROM : WFL  Strength RLE  Strength RLE: WFL  Strength LLE  Strength LLE: WFL     Sensation  Overall Sensation Status: WFL  Bed mobility  Supine to Sit: Stand by assistance  Sit to Supine: Unable to assess  Scooting: Stand by assistance  Comment: pt reports sleeping in recliner at home;  pt retired to chair at end of session.   Transfers  Sit to Stand: Contact guard assistance  Stand to sit: Contact guard assistance  Squat Pivot Transfers: Contact guard assistance  Comment: transfers with RW  Ambulation  Ambulation?: Yes  Ambulation 1  Surface: level tile  Device: Rolling Walker  Assistance: Contact guard assistance  Quality of Gait: decreased step length and slow phan Gait Deviations: Slow Rose;Decreased step length  Distance: 125' x 1  Stairs/Curb  Stairs?: No     Balance  Posture: Fair  Sitting - Static: Good  Sitting - Dynamic: Good  Standing - Static: Good;-  Standing - Dynamic: Fair;+  Comments: balance assessed with RW        Plan   Plan  Times per week: 5-6x/week  Times per day: Daily  Current Treatment Recommendations: Strengthening, Transfer Training, Endurance Training, Patient/Caregiver Education & Training, Balance Training, Gait Training, Safety Education & Training, Functional Mobility Training  Safety Devices  Type of devices: Gait belt, Call light within reach, Chair alarm in place, Left in chair  Restraints  Initially in place: No    G-Code       OutComes Score                                                  AM-PAC Score     AM-PAC Inpatient Mobility without Stair Climbing Raw Score : 17 (12/31/20 1336)  AM-PAC Inpatient without Stair Climbing T-Scale Score : 48.47 (12/31/20 1336)  Mobility Inpatient CMS 0-100% Score: 32.72 (12/31/20 1336)  Mobility Inpatient without Stair CMS G-Code Modifier : Willard Day (12/31/20 1336)       Goals  Short term goals  Time Frame for Short term goals: 14 visits  Short term goal 1: Pt to transfer Independently with device as needed. Short term goal 2: Pt to tolerate 30 minutes of therapy activity to improve endurance for mobility. Short term goal 3: Pt to ambulate with device as needed > 250' Independently without LOB. Short term goal 4: Improve standing static/dynamic balance with device if needed to Good to minimize fall risk.   Patient Goals   Patient goals : return home       Therapy Time   Individual Concurrent Group Co-treatment   Time In 5684         Time Out 1109         Minutes 22         Timed Code Treatment Minutes: [address], PT

## 2020-12-31 NOTE — CONSULTS
Renal Consult Note    Patient :  Aidan Nolan; 66 y.o. MRN# [id number]  Location:  320/320-01  Attending:  Pablo Boone MD  Admit Date:  12/30/2020   Hospital Day: 1    Reason for Consult:     Asked by Dr Pablo Boone MD to see for PATITO/Elevated Creatinine. History Obtained From:     patient    History of Present Illness:     Aidan Nolan; 66 y.o. female with past medical history as mentioned below. 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 Berry Kahn in the office, proteinuria has been in the 0.3 to 0.5 g range. She also has known history of dilated cardiomyopathy with an ejection fraction of 25 to 30% moderate to severe aortic regurgitation. Denies any known history of coronary artery disease as such. In addition in the past she has known history of colon cancer underwent colon resection followed by anastomotic leak followed by colostomy and repair of leak about 4 to 5 years ago. Since then she has had a colostomy which has been functioning fine. Denies any history of excessive colostomy drainage or abdominal pain. About 2 to 3 weeks ago she noticed onset of dizziness and lightheadedness and had near syncopal episode. That resolved. Subsequently she did well up until a day prior to admission when she started noticing onset of abdominal cramping after having a bowl of chili which she feels may have been bad. Thereafter noticed excessive output from her colostomy bag. She also had symptoms of nausea and vomiting which worsened after admission to the hospital.  On presentation she was somewhat hypotensive with a blood pressure in the 90 systolic range. She also had a creatinine of 3.0 which is higher than her baseline. Since then she is been placed on IV fluids, urine output has been good, repeat labs have not been done. She tells me abdominal pain is gone and output from her colostomy has decreased as well. She feels hungry and oral intake is been it being advanced. Abdominal x-ray did show presence of dilated small bowel loops consistent with ileus. Also mention of parastomal hernia. General surgery consult was reviewed. No history of recent contrast exposure, No h/o prolonged NSAIDs use in the past, No h/o nephrolithiasis, No recent skin rashes or arthralgias, No hematuria or pyuria noticed in the recent past. Doesn't report any reduction in the urine output recently. Non report of any obstructive urinary symptoms (urgency, frequency, weak stream, straining while urination). No h/o recurrent UTIs in the past.    Past History/Allergies? Social History:     Past Medical History:   Diagnosis Date    Abdominal pain, unspecified site     Back pain     Cancer ([facility]     colon    Chronic kidney disease     Diabetes ([facility]     Edema     Hypertension     Nocturia     SOB (shortness of breath)        Allergies   Allergen Reactions    Oxycodone Hcl     Tylenol With Codeine #3 [Acetaminophen-Codeine]        Social History     Socioeconomic History    Marital status:      Spouse name: Not on file    Number of children: Not on file    Years of education: Not on file    Highest education level: Not on file   Occupational History    Not on file   Social Needs    Financial resource strain: Not on file    Food insecurity     Worry: Not on file     Inability: Not on file    Transportation needs     Medical: Not on file     Non-medical: Not on file   Tobacco Use    Smoking status: Former Smoker    Smokeless tobacco: Never Used   Substance and Sexual Activity    Alcohol use: No    Drug use: No    Sexual activity: Not on file   Lifestyle    Physical activity     Days per week: Not on file Minutes per session: Not on file    Stress: Not on file   Relationships    Social connections     Talks on phone: Not on file     Gets together: Not on file     Attends Muslim service: Not on file     Active member of club or organization: Not on file     Attends meetings of clubs or organizations: Not on file     Relationship status: Not on file    Intimate partner violence     Fear of current or ex partner: Not on file     Emotionally abused: Not on file     Physically abused: Not on file     Forced sexual activity: Not on file   Other Topics Concern    Not on file   Social History Narrative    Not on file       Family History:        Family History   Problem Relation Age of Onset    Other Mother         cva    Other Father         cva       Outpatient Medications:     Medications Prior to Admission: acetaminophen (TYLENOL) 500 MG tablet, Take 1,000 mg by mouth every 6 hours as needed for Pain  FLUoxetine (PROZAC) 10 MG capsule, Take 10 mg by mouth daily  sacubitril-valsartan (ENTRESTO) 24-26 MG per tablet, Take 1 tablet by mouth daily   aspirin 81 MG tablet, Take 81 mg by mouth daily  buPROPion (WELLBUTRIN XL) 150 MG extended release tablet, Take 150 mg by mouth every morning  carvedilol (COREG) 3.125 MG tablet, Take 3.125 mg by mouth 2 times daily (with meals)  furosemide (LASIX) 20 MG tablet, Take 40 mg by mouth daily   glimepiride (AMARYL) 2 MG tablet, Take 2 mg by mouth every morning (before breakfast)  Ferrous Fumarate 325 (106 FE) MG TABS, Take 324 mg by mouth three times daily   atorvastatin (LIPITOR) 40 MG tablet, Take 40 mg by mouth daily  [DISCONTINUED] Potassium Chloride Jill CR (KLOR-CON M20 PO), Take by mouth    Current Medications:     Scheduled Meds:    [START ON 1/1/2021] famotidine (PEPCID) injection  20 mg Intravenous Daily    sodium chloride flush  10 mL Intravenous 2 times per day    heparin (porcine)  5,000 Units Subcutaneous 3 times per day  insulin lispro  0-6 Units Subcutaneous TID     insulin lispro  0-3 Units Subcutaneous Nightly    cefTRIAXone (ROCEPHIN) IV  1 g Intravenous Q24H     Continuous Infusions:    sodium chloride      IV infusion builder      dextrose       PRN Meds:  sodium chloride flush, potassium chloride **OR** potassium alternative oral replacement **OR** potassium chloride, magnesium sulfate, nicotine, promethazine **OR** ondansetron, polyethylene glycol, glucose, dextrose, glucagon (rDNA), dextrose, morphine    Review of Systems:     Constitutional: No fever, no chills, no lethargy, no weakness. HEENT:  No headache, otalgia, itchy eyes, nasal discharge or sore throat. Cardiac:  No chest pain, dyspnea, orthopnea or PND. Chest:  No cough, phlegm or wheezing. Abdomen:  No abdominal pain, nausea or vomiting. Neuro:  No focal weakness, abnormal movements orseizure like activity. Skin:   No rashes, no itching. :   No hematuria, no pyuria, no dysuria, no flank pain. Extremities:  No swelling or joint pains. ROS was otherwise negative except as mentioned in the [address] Input/Output:       I/O last 3 completed shifts: In: 817.5 [P.O.:350; I.V.:467.5]  Out: 375 [Urine:350; Stool:25]  Patient Vitals for the past 96 hrs (Last 3 readings):   Weight   20 0516 160 lb 11.5 oz (72.9 kg)   20 1810 160 lb 11.5 oz (72.9 kg)     Vital Signs:   Temperature:  Temp: 97.6 °F (36.4 °C)  TMax:   Temp (24hrs), Av.9 °F (36.6 °C), Min:97.6 °F (36.4 °C), Max:98.5 °F (36.9 °C)    Respirations:  Resp: 18  Pulse:   Pulse: 69  BP:    BP: (!) 118/59  BP Range: Systolic (32AKL), PSI:979 , Min:90 , VHJ:281       Diastolic (85YOF), JKP:81, Min:46, Max:59      Physical Examination:     General:  AAO x 3, speaking in full sentences, no accessory muscle use. HEENT: Atraumatic, normocephalic, no throat congestion, moist mucosa. Eyes:   Pupils equal, round and reactive to light, EOMI. Urine Protein:   No results found for: TPU  Urine Creatinine:     Lab Results   Component Value Date    LABCREA 129.6 12/31/2020     UPC:     Urine Eosinophils:  No components found for: UEOS    Radiology:     CXR:     Assessment:     1. Acute Kidney Injury: Likely prerenal azotemia/ischemic ATN from intravascular volume depletion from insensible losses, small bowel obstruction, large colostomy output after eating a bowl of chili. She developed hypotension as result of it. Baseline creatinine 1.82.0 which peaked up to 3.0  2. Chronic kidney disease stage IV from diabetic nephrosclerosis baseline 1.82.0 proteinuria 0.5 g bilateral small kidneys about 8.2 cm on ultrasound. Follows up with Dr. Emiliano Awad. Previous work-up for immune complex and paraprotein disease negative  3. Small bowel obstruction suspected parastomal hernia leading to presentation, resolving  4. Dilated cardiomyopathy ejection fraction 25 to 30%  5. Type 2 diabetes with complications  6. [place] metabolic acidosis from GI losses bicarbonate was 19 anion gap 12    Plan:   1. Change IV fluids to half saline at 50 mL an hour  2. Keep systolic pressure more than 110, add low-dose ProAmatine 2.53 times a day  3. Strict intake output and daily weights. 4.  Check BMP daily, expect renal function to improve  5. Patient stable for discharge if creatinine continues to improve. 6.  We will follow with you    Nutrition   Please ensure that patient is on a renal diet/TF. Avoid nephrotoxic drugs/contrast exposure. Thank you for the consultation. Please do not hesitate to contact us for any further questions/concerns. We will continue to follow along with you.

## 2020-12-31 NOTE — PLAN OF CARE
Problem: Skin Integrity:  Goal: Will show no infection signs and symptoms  Description: Will show no infection signs and symptoms  Outcome: Ongoing  Goal: Absence of new skin breakdown  Description: Absence of new skin breakdown  Outcome: Ongoing   Pt skin remains intact, mepilex re enforced on buttocks, waffle mattress on bed and pt encouraged to turn q 2 hs. Will continue to monitor. Problem: Falls - Risk of:  Goal: Will remain free from falls  Description: Will remain free from falls  Outcome: Ongoing  Goal: Absence of physical injury  Description: Absence of physical injury  Outcome: Ongoing    Pt free from falls this shift bed locked and in lowest position, Bedside table and call light within reach, and side rails up x 2. Bed alarm activated. Problem: Infection:  Goal: Will remain free from infection  Description: Will remain free from infection  Outcome: Ongoing   Pt VS will remain stable and Labs will return to normal.      Problem: Safety:  Goal: Free from accidental physical injury  Description: Free from accidental physical injury  Outcome: Ongoing  Goal: Free from intentional harm  Description: Free from intentional harm  Outcome: Ongoing    Pt free from falls this shift bed locked and in lowest position, Bedside table and call light within reach, and side rails up x 2. Bed alarm activated. Problem: Daily Care:  Goal: Daily care needs are met  Description: Daily care needs are met  Outcome: Ongoing  RN will assist pt with ADLs as needed. Problem: Pain:  Goal: Patient's pain/discomfort is manageable  Description: Patient's pain/discomfort is manageable  Outcome: Ongoing    Pt assessed for pain, non-pharm interventions used, pain meds given if needed, Pt reassesed for pain.

## 2020-12-31 NOTE — CONSULTS
HxCC:  67 y/o female consulted for evaluation of small bowel obstruction. Patient has complicated surgical history. Patient with hx of colectomy for colon cancer. Surgery was complicated by anastomotic leak. Patient now with colostomy. Patient was transferred to for 1 day history of abdominal pain. Patient with vomiting and watery output from stoma. Patient states stools are starting to form up. Patient denies abdominal pain. Patient wants to eat. Denies fevers, chills, or sweats.       Vitals:    12/31/20 0729   BP: (!) 98/53   Pulse: 69   Resp: 18   Temp: 98.2 °F (36.8 °C)   SpO2: 95%       Patient Active Problem List   Diagnosis    SBO (small bowel obstruction) (HCC)    H/O malignant neoplasm of colon    CHF (congestive heart failure) (HCC)    Chronic systolic heart failure (HCC)    CKD (chronic kidney disease), stage IV (HCC)    Colostomy present (HCC)    Dyspnea    Essential hypertension    Hyperlipidemia    Lower urinary tract infectious disease    Lymphedema    Malignant neoplasm of descending colon (HCC)    Obesity    Type II diabetes mellitus (HCC)       Current Facility-Administered Medications   Medication Dose Route Frequency Provider Last Rate Last Admin    [START ON 1/1/2021] famotidine (PEPCID) injection 20 mg  20 mg Intravenous Daily Joni Eldridge, APRN - CNP        sodium chloride flush 0.9 % injection 10 mL  10 mL Intravenous 2 times per day Joni Eldridge, APRN - CNP   10 mL at 12/31/20 0845    sodium chloride flush 0.9 % injection 10 mL  10 mL Intravenous PRN Joni Eldridge, APRN - CNP        potassium chloride (KLOR-CON M) extended release tablet 40 mEq  40 mEq Oral PRN Joni Eldridge, APRN - CNP        Or    potassium bicarb-citric acid (EFFER-K) effervescent tablet 40 mEq  40 mEq Oral PRN Joni Arabia, APRN - CNP        Or    potassium chloride 10 mEq/100 mL IVPB (Peripheral Line)  10 mEq Intravenous PRN Joni Arabia, APRN - CNP  magnesium sulfate 1 g in dextrose 5% 100 mL IVPB  1 g Intravenous PRN Emmett Arabia, APRN - CNP        nicotine (NICODERM CQ) 21 MG/24HR 1 patch  1 patch Transdermal Daily PRN Emmett Arabia, APRN - CNP        promethazine (PHENERGAN) tablet 12.5 mg  12.5 mg Oral Q6H PRN Emmett Arabia, APRN - CNP        Or    ondansetron (ZOFRAN) injection 4 mg  4 mg Intravenous Q6H PRN Emmett Arabia, APRN - CNP        polyethylene glycol (GLYCOLAX) packet 17 g  17 g Oral Daily PRN Emmett Arabia, APRN - CNP        0.9 % sodium chloride infusion   Intravenous Continuous Emmett Arabia, APRN - CNP 50 mL/hr at 12/30/20 2028 New Bag at 12/30/20 2028    heparin (porcine) injection 5,000 Units  5,000 Units Subcutaneous 3 times per day Emmett Arabia, APRN - CNP   5,000 Units at 12/31/20 0545    insulin lispro (HUMALOG) injection vial 0-6 Units  0-6 Units Subcutaneous TID WC Emmett Arabia, APRN - CNP   Stopped at 12/31/20 0845    insulin lispro (HUMALOG) injection vial 0-3 Units  0-3 Units Subcutaneous Nightly Sumner Arabia, APRN - CNP        glucose (GLUTOSE) 40 % oral gel 15 g  15 g Oral PRN Emmett Arabia, APRN - CNP        dextrose 50 % IV solution  12.5 g Intravenous PRN Emmett Arabia, APRN - CNP        glucagon (rDNA) injection 1 mg  1 mg Intramuscular PRN Emmett Arabia, APRN - CNP        dextrose 5 % solution  100 mL/hr Intravenous PRN Emmett Arabia, APRN - CNP        cefTRIAXone (ROCEPHIN) 1 g IVPB in 50 mL D5W minibag  1 g Intravenous Q24H Emmett Arabia, APRN - CNP        morphine (PF) injection 2 mg  2 mg Intravenous Q4H PRN Emmett Arabia, APRN - CNP           Allergies   Allergen Reactions    Oxycodone Hcl     Tylenol With Codeine #3 [Acetaminophen-Codeine]        Past Medical History:   Diagnosis Date    Abdominal pain, unspecified site     Back pain     Cancer (HCC)     colon    Chronic kidney disease     [address] [address]     Edema     Hypertension     Nocturia  SOB (shortness of breath)        Past Surgical History:   Procedure Laterality Date    COLONOSCOPY      UPPER GASTROINTESTINAL ENDOSCOPY         Family History   Problem Relation Age of Onset    Other Mother         cva    Other Father         cva       Review of Systems:  14 systems were reviewed. Positives noted above. Remainder are negative per CMS guidelines. Exam  General: AAOx3, NAD  Head: atraumatic normocephalic  Neck: trachea midline. No masses or lymphadenopathy  Abdomen: soft, nontender, and nondistended. Parastomal hernia is reducible. Ostomy is healthy and patent. Stool noted in ostomy bag. Ext: motor 5/5 all extremities with no gross deformities    Impression:  1. Parastomal hernia  2. Abdominal pain resolved    Plan:  Patient does not have an acute surgical abdomen. Patient does not require emergent surgery. Patient's original surgeon was Dr. Nicanor Garcia. Recommend follow up with Dr Nicanor Garcia for evaluation of parastomal hernia.     Electronically signed by Jhonathan Stoddard IV, DO on 12/31/20 at 9:59 AM EST

## 2020-12-31 NOTE — FLOWSHEET NOTE
Situation:  Writer provided an initial visit with Ms. Pablo Traore. Assessment:  Ms. Pablo Traore was lying flat in bed. She was on the phone but got off and welcomed writer's visit. She showed writer her stomach and the reason she was admitted. She talked about some of the health challenges she has had the past four years. She shared about events in her life that were difficult and became tearful when talking about them. She reflected how she has coped with them, noting that she realized \"two wrongs don't make a right. \" She identified serving and helping others in her work and life, her michelle, and her family as sources of support. She was receptive to prayer and \"Guideposts. \"    Intervention:  Writer provided supportive presence and explored Pt's coping and needs; inquired about Pt's sources of support and strength; offered words of encouragement and support; affirmed Pt's strengths; prayed for Pt; gave Pt a copy of \"Guideposts. \"    Outcome:  Ms. Pablo Traore thanked writer for the visit. 12/31/20 1843   Encounter Summary   Services provided to: Patient   Referral/Consult From: [facility] Family members; Children;Spouse   Continue Visiting   (12/31/20)   Complexity of Encounter Moderate   Length of Encounter 30 minutes   Spiritual Assessment Completed Yes   Routine   Type Initial   Spiritual/Anabaptism   Type Spiritual support   Assessment Calm; Approachable   Intervention Active listening;Explored feelings, thoughts, concerns;Explored coping resources;Provided reading materials/devotional materials;Prayer;Sustaining presence/ Ministry of presence; Discussed meaning/purpose;Discussed relationship with God;Discussed belief system/Nondenominational practices/michelle;Discussed illness/injury and it's impact   Outcome Receptive; Hopeful;Encouraged;Expressed gratitude;Engaged in conversation;Expressed feelings/needs/concerns;Coping; Shared reminiscences;Grieving Electronically signed by Chey Gomez, Oncology [facility], [address] [facility]  12/31/2020  6:55 PM

## 2020-12-31 NOTE — PROGRESS NOTES
Pt a direct admit from [facility]; arrived approximately 1800 hrs. Pt alert and oriented. Pt oriented to call light system and agreeable to call for assistance.   Admission documentation / database completed; VS obtained; pt washed up at bedside; telemetry placed on pt; plan of care reviewed with pt; questions answered; care of pt turned over to night shift JOI Atkinson

## 2021-01-01 ENCOUNTER — APPOINTMENT (OUTPATIENT)
Dept: GENERAL RADIOLOGY | Age: 76
DRG: 388 | End: 2021-01-01
Attending: INTERNAL MEDICINE
Payer: MEDICARE

## 2021-01-01 VITALS
HEIGHT: 60 IN | WEIGHT: 164.24 LBS | DIASTOLIC BLOOD PRESSURE: 94 MMHG | RESPIRATION RATE: 20 BRPM | HEART RATE: 75 BPM | BODY MASS INDEX: 32.25 KG/M2 | SYSTOLIC BLOOD PRESSURE: 129 MMHG | OXYGEN SATURATION: 98 % | TEMPERATURE: 97.5 F

## 2021-01-01 LAB
ANION GAP SERPL CALCULATED.3IONS-SCNC: 12 MMOL/L (ref 9–17)
BUN BLDV-MCNC: 46 MG/DL (ref 8–23)
BUN/CREAT BLD: ABNORMAL (ref 9–20)
CALCIUM SERPL-MCNC: 9.2 MG/DL (ref 8.6–10.4)
CHLORIDE BLD-SCNC: 112 MMOL/L (ref 98–107)
CO2: 20 MMOL/L (ref 20–31)
CREAT SERPL-MCNC: 2.27 MG/DL (ref 0.5–0.9)
EKG ATRIAL RATE: 63 BPM
EKG P AXIS: 43 DEGREES
EKG P-R INTERVAL: 158 MS
EKG Q-T INTERVAL: 568 MS
EKG QRS DURATION: 150 MS
EKG QTC CALCULATION (BAZETT): 581 MS
EKG R AXIS: -3 DEGREES
EKG T AXIS: -167 DEGREES
EKG VENTRICULAR RATE: 63 BPM
GFR AFRICAN AMERICAN: 25 ML/MIN
GFR NON-AFRICAN AMERICAN: 21 ML/MIN
GFR SERPL CREATININE-BSD FRML MDRD: ABNORMAL ML/MIN/{1.73_M2}
GFR SERPL CREATININE-BSD FRML MDRD: ABNORMAL ML/MIN/{1.73_M2}
GLUCOSE BLD-MCNC: 168 MG/DL (ref 65–105)
GLUCOSE BLD-MCNC: 86 MG/DL (ref 70–99)
HCT VFR BLD CALC: 32.1 % (ref 36–46)
HEMOGLOBIN: 10.2 G/DL (ref 12–16)
MAGNESIUM: 2.2 MG/DL (ref 1.6–2.6)
MCH RBC QN AUTO: 29.8 PG (ref 26–34)
MCHC RBC AUTO-ENTMCNC: 31.9 G/DL (ref 31–37)
MCV RBC AUTO: 93.4 FL (ref 80–100)
NRBC AUTOMATED: ABNORMAL PER 100 WBC
PDW BLD-RTO: 16.3 % (ref 12.5–15.4)
PLATELET # BLD: 211 K/UL (ref 140–450)
PMV BLD AUTO: 7.7 FL (ref 6–12)
POTASSIUM SERPL-SCNC: 4.5 MMOL/L (ref 3.7–5.3)
RBC # BLD: 3.43 M/UL (ref 4–5.2)
SODIUM BLD-SCNC: 144 MMOL/L (ref 135–144)
WBC # BLD: 5 K/UL (ref 3.5–11)

## 2021-01-01 PROCEDURE — 6360000002 HC RX W HCPCS: Performed by: NURSE PRACTITIONER

## 2021-01-01 PROCEDURE — 85027 COMPLETE CBC AUTOMATED: CPT

## 2021-01-01 PROCEDURE — 2500000003 HC RX 250 WO HCPCS: Performed by: NURSE PRACTITIONER

## 2021-01-01 PROCEDURE — 99232 SBSQ HOSP IP/OBS MODERATE 35: CPT | Performed by: INTERNAL MEDICINE

## 2021-01-01 PROCEDURE — 97535 SELF CARE MNGMENT TRAINING: CPT

## 2021-01-01 PROCEDURE — 6370000000 HC RX 637 (ALT 250 FOR IP): Performed by: NURSE PRACTITIONER

## 2021-01-01 PROCEDURE — 80048 BASIC METABOLIC PNL TOTAL CA: CPT

## 2021-01-01 PROCEDURE — 83735 ASSAY OF MAGNESIUM: CPT

## 2021-01-01 PROCEDURE — 97116 GAIT TRAINING THERAPY: CPT

## 2021-01-01 PROCEDURE — 82947 ASSAY GLUCOSE BLOOD QUANT: CPT

## 2021-01-01 PROCEDURE — 2580000003 HC RX 258: Performed by: NURSE PRACTITIONER

## 2021-01-01 PROCEDURE — APPSS45 APP SPLIT SHARED TIME 31-45 MINUTES: Performed by: NURSE PRACTITIONER

## 2021-01-01 PROCEDURE — 99238 HOSP IP/OBS DSCHRG MGMT 30/<: CPT | Performed by: INTERNAL MEDICINE

## 2021-01-01 PROCEDURE — 36415 COLL VENOUS BLD VENIPUNCTURE: CPT

## 2021-01-01 PROCEDURE — 74018 RADEX ABDOMEN 1 VIEW: CPT

## 2021-01-01 RX ORDER — CEFUROXIME AXETIL 250 MG/1
250 TABLET ORAL 2 TIMES DAILY
Qty: 10 TABLET | Refills: 0 | Status: SHIPPED | OUTPATIENT
Start: 2021-01-01 | End: 2021-01-06

## 2021-01-01 RX ORDER — ASPIRIN 81 MG/1
81 TABLET, CHEWABLE ORAL DAILY
Status: DISCONTINUED | OUTPATIENT
Start: 2021-01-01 | End: 2021-01-01 | Stop reason: HOSPADM

## 2021-01-01 RX ORDER — CARVEDILOL 3.12 MG/1
3.12 TABLET ORAL 2 TIMES DAILY WITH MEALS
Status: DISCONTINUED | OUTPATIENT
Start: 2021-01-01 | End: 2021-01-01 | Stop reason: HOSPADM

## 2021-01-01 RX ORDER — MIDODRINE HYDROCHLORIDE 2.5 MG/1
2.5 TABLET ORAL
Qty: 90 TABLET | Refills: 3 | Status: SHIPPED | OUTPATIENT
Start: 2021-01-01

## 2021-01-01 RX ORDER — ACETAMINOPHEN 325 MG/1
650 TABLET ORAL EVERY 4 HOURS PRN
Status: DISCONTINUED | OUTPATIENT
Start: 2021-01-01 | End: 2021-01-01 | Stop reason: HOSPADM

## 2021-01-01 RX ORDER — BUPROPION HYDROCHLORIDE 150 MG/1
150 TABLET ORAL EVERY MORNING
Status: DISCONTINUED | OUTPATIENT
Start: 2021-01-01 | End: 2021-01-01 | Stop reason: HOSPADM

## 2021-01-01 RX ORDER — MIDODRINE HYDROCHLORIDE 2.5 MG/1
2.5 TABLET ORAL 3 TIMES DAILY
Qty: 90 TABLET | Refills: 0 | Status: SHIPPED | OUTPATIENT
Start: 2021-01-01

## 2021-01-01 RX ADMIN — FAMOTIDINE 20 MG: 10 INJECTION, SOLUTION INTRAVENOUS at 08:58

## 2021-01-01 RX ADMIN — ACETAMINOPHEN 650 MG: 325 TABLET ORAL at 10:25

## 2021-01-01 RX ADMIN — CEFTRIAXONE SODIUM 1 G: 1 INJECTION, POWDER, FOR SOLUTION INTRAMUSCULAR; INTRAVENOUS at 14:55

## 2021-01-01 RX ADMIN — BUPROPION HYDROCHLORIDE 150 MG: 150 TABLET, FILM COATED, EXTENDED RELEASE ORAL at 08:56

## 2021-01-01 RX ADMIN — HEPARIN SODIUM 5000 UNITS: 5000 INJECTION INTRAVENOUS; SUBCUTANEOUS at 06:18

## 2021-01-01 RX ADMIN — ASPIRIN 81 MG: 81 TABLET, CHEWABLE ORAL at 08:56

## 2021-01-01 ASSESSMENT — PAIN SCALES - GENERAL: PAINLEVEL_OUTOF10: 3

## 2021-01-01 NOTE — PROGRESS NOTES
PATIENT NAME: Hemant Guillen     TODAY'S DATE: 1/1/2021, 9:16 AM    SUBJECTIVE:    67 y/o female with parastomal hernia doing well today. Denies abdominal pain. Tolerating full liquids with no nausea or vomiting. Patient making stool through stoma. Patient states stools are starting to become more formed. OBJECTIVE:   VITALS:  /73   Pulse 82   Temp 98.3 °F (36.8 °C) (Oral)   Resp 18   Ht 5' (1.524 m)   Wt 164 lb 3.9 oz (74.5 kg)   SpO2 97%   BMI 32.08 kg/m²      INTAKE/OUTPUT:      Intake/Output Summary (Last 24 hours) at 1/1/2021 0916  Last data filed at 1/1/2021 0851  Gross per 24 hour   Intake 2360.73 ml   Output 500 ml   Net 1860.73 ml                 CONSTITUTIONAL:  awake and alert. no apparent distress, No acute distress  ABDOMEN:   Soft, nontender and nondistended. Stoma is pink and patent. Data:  CBC:   Lab Results   Component Value Date    WBC 5.0 01/01/2021    RBC 3.43 01/01/2021    HGB 10.2 01/01/2021    HCT 32.1 01/01/2021    MCV 93.4 01/01/2021    MCH 29.8 01/01/2021    MCHC 31.9 01/01/2021    RDW 16.3 01/01/2021     01/01/2021    MPV 7.7 01/01/2021       ASSESSMENT   1. Parastomal hernia    Plan  1. Patient denies abdominal pain. Benign abdominal exam.  Patient does not have an acute surgical abdomen. Patient can be discharged on surgery standpoint. Recommend follow up with her primary surgeon, Dr. Larissa Cabral as an outpatient for evaluation of ostomy reversal. Patient understands and agrees with plan.     Electronically signed by Natalie Stoddard IV, DO  on 1/1/2021 at 9:16 AM

## 2021-01-01 NOTE — PROGRESS NOTES
Physical Therapy  Facility/Department: [facility] MED SURG ICU  Daily Treatment Note  NAME: Deuce Yun  : 1945  MRN: [id number]  No chief complaint on file. Date of Service: 2021    Discharge Recommendations:  Continue to assess pending progress        Assessment   Assessment: Pt with improved ambulation distance this session requring SBA x 1 and use of RW.  shuffling but steady gait with AD useds. would benefit from continued therapy after discharge  Activity Tolerance  Activity Tolerance: Patient Tolerated treatment well     Patient Diagnosis(es): The encounter diagnosis was CKD (chronic kidney disease), stage IV ([facility] [address] has a past medical history of Abdominal pain, unspecified site, Back pain, Cancer ([facility] [address] Chronic kidney disease, Diabetes ([facility] [address] Edema, Hypertension, Nocturia, and SOB (shortness of breath). has a past surgical history that includes Colonoscopy and Upper gastrointestinal endoscopy. Restrictions  Restrictions/Precautions  Restrictions/Precautions: General Precautions, Up as Tolerated  Required Braces or Orthoses?: No  Implants present? : (pt denies)  Position Activity Restriction  Other position/activity restrictions: up as tolerated  Subjective   Subjective  Subjective: Pt supine in bed and agreeable to therapy. Pt with no c/o pain. Nursing agreeable as well. General Comment  Comments: Pt with pending discharge this afternoon.   Pain Screening  Patient Currently in Pain: Denies  Vital Signs  Patient Currently in Pain: Denies       Orientation  Orientation  Overall Orientation Status: Within Normal Limits  Cognition   Cognition  Overall Cognitive Status: WFL  Objective   Bed mobility  Supine to Sit: Stand by assistance  Sit to Supine: (Pt in chair at end of session)  Scooting: Stand by assistance  Transfers  Sit to Stand: Stand by assistance  Stand to sit: Stand by assistance  Bed to Chair: Stand by assistance  Comment: transfers with RW, wnl safety awareness Ambulation  Ambulation?: Yes  More Ambulation?: No  Ambulation 1  Surface: level tile  Device: Rolling Walker  Assistance: Stand by assistance  Quality of Gait: slow phan and shuffling gait. Distance: 130 feet x 1  Stairs/Curb  Stairs?: No     Balance  Posture: Fair  Sitting - Static: Good  Sitting - Dynamic: Good  Standing - Static: Good  Standing - Dynamic: Fair;+  Exercises  Comments: seated Bilateral LE exercies 15 reps each:  ankle pumps, LAQ, pillow squeeze, marching all as tolerated                        G-Code     OutComes Score                                                     AM-PAC Score             Goals  Short term goals  Time Frame for Short term goals: 14 visits  Short term goal 1: Pt to transfer Independently with device as needed. Short term goal 2: Pt to tolerate 30 minutes of therapy activity to improve endurance for mobility. Short term goal 3: Pt to ambulate with device as needed > 250' Independently without LOB. Short term goal 4: Improve standing static/dynamic balance with device if needed to Good to minimize fall risk.   Patient Goals   Patient goals : return home    Plan    Plan  Times per week: 5-6x/week  Times per day: Daily  Current Treatment Recommendations: Strengthening, Transfer Training, Endurance Training, Patient/Caregiver Education & Training, Balance Training, Gait Training, Safety Education & Training, Functional Mobility Training  Safety Devices  Type of devices: Gait belt, Call light within reach, Chair alarm in place, Nurse notified, Left in chair  Restraints  Initially in place: No     Therapy Time   Individual Concurrent Group Co-treatment   Time In 1040         Time Out 1055         Minutes Lavinia. Nghia 58, PT

## 2021-01-01 NOTE — PLAN OF CARE
Problem: Skin Integrity:  Goal: Will show no infection signs and symptoms  Description: Will show no infection signs and symptoms  1/1/2021 0224 by Za Salcedo RN  Outcome: Ongoing     Problem: Skin Integrity:  Goal: Absence of new skin breakdown  Description: Absence of new skin breakdown  1/1/2021 0224 by Za Salcedo RN  Outcome: Ongoing   Pt skin remains intact, mepilex re enforced on buttocks, waffle mattress on bed and pt encouraged to turn q 2 hs. Will continue to monitor. Problem: Falls - Risk of:  Goal: Will remain free from falls  Description: Will remain free from falls  1/1/2021 0224 by Za Salcedo RN  Outcome: Ongoing     Problem: Falls - Risk of:  Goal: Absence of physical injury  Description: Absence of physical injury  1/1/2021 0224 by Za Salcedo RN  Outcome: Ongoing   Pt remained free of falls, non skid socks are on, bed alarm on, call light within reach, and patient instructed to call for help when needed. Problem: Infection:  Goal: Will remain free from infection  Description: Will remain free from infection  1/1/2021 0224 by Za Salcedo RN  Outcome: Ongoing   Pt VS will remain stable and Labs will return to normal.      Problem: Daily Care:  Goal: Daily care needs are met  Description: Daily care needs are met  1/1/2021 0224 by Za Salcedo RN  Outcome: Ongoing   RN will assist pt with ADLs as needed.

## 2021-01-01 NOTE — PROGRESS NOTES
Renal Progress Note    Patient :  Ruddy Ellis; 66 y.o. MRN# [id number]  Location:  320/320-01  Attending:  Enrique Rivera MD  Admit Date:  12/30/2020   Hospital Day: 2      Subjective:     Oral intake good. Urine output good. Not being measured accurately. No shortness of breath orthopnea. Creatinine down to 2.2. Blood pressures are stable. IV fluids continue, no shortness of breath orthopnea. Patient tells me in the past she has never had dyspnea or left heart failure. The etiology of cardiomyopathy is not clear. Does not have a cardiologist who she follows up with regularly. Has been started on Entresto by her PCP also put on Lasix recently for lower extremity edema. Tells me edema has not improved despite Lasix use. Since hospitalization colostomy output has slowed down. No nausea vomiting. Echocardiogram and ultrasound scan of the kidney reviewed.     Outpatient Medications:     Medications Prior to Admission: acetaminophen (TYLENOL) 500 MG tablet, Take 1,000 mg by mouth every 6 hours as needed for Pain  FLUoxetine (PROZAC) 10 MG capsule, Take 10 mg by mouth daily  sacubitril-valsartan (ENTRESTO) 24-26 MG per tablet, Take 1 tablet by mouth daily   aspirin 81 MG tablet, Take 81 mg by mouth daily  buPROPion (WELLBUTRIN XL) 150 MG extended release tablet, Take 150 mg by mouth every morning  carvedilol (COREG) 3.125 MG tablet, Take 3.125 mg by mouth 2 times daily (with meals)  furosemide (LASIX) 20 MG tablet, Take 40 mg by mouth daily   glimepiride (AMARYL) 2 MG tablet, Take 2 mg by mouth every morning (before breakfast)  Ferrous Fumarate 325 (106 FE) MG TABS, Take 324 mg by mouth three times daily   atorvastatin (LIPITOR) 40 MG tablet, Take 40 mg by mouth daily  [DISCONTINUED] Potassium Chloride Jill CR (KLOR-CON M20 PO), Take by mouth    Current Medications:     Scheduled Meds:    aspirin  81 mg Oral Daily    buPROPion  150 mg Oral QAM    carvedilol  3.125 mg Oral BID WC  sacubitril-valsartan  1 tablet Oral Daily    famotidine (PEPCID) injection  20 mg Intravenous Daily    midodrine  2.5 mg Oral TID     sodium chloride flush  10 mL Intravenous 2 times per day    heparin (porcine)  5,000 Units Subcutaneous 3 times per day    insulin lispro  0-6 Units Subcutaneous TID     insulin lispro  0-3 Units Subcutaneous Nightly    cefTRIAXone (ROCEPHIN) IV  1 g Intravenous Q24H     Continuous Infusions:    dextrose       PRN Meds:  sodium chloride flush, potassium chloride **OR** potassium alternative oral replacement **OR** potassium chloride, magnesium sulfate, nicotine, promethazine **OR** ondansetron, polyethylene glycol, glucose, dextrose, glucagon (rDNA), dextrose, morphine    Input/Output:       I/O last 3 completed shifts: In: 2246.7 [P.O.:1050; I.V.:1196.7]  Out: 500 [Urine:150; Stool:350]. Patient Vitals for the past 96 hrs (Last 3 readings):   Weight   21 0427 164 lb 3.9 oz (74.5 kg)   20 0516 160 lb 11.5 oz (72.9 kg)   20 1810 160 lb 11.5 oz (72.9 kg)       Vital Signs:   Temperature:  Temp: 98.3 °F (36.8 °C)  TMax:   Temp (24hrs), Av °F (36.7 °C), Min:97.6 °F (36.4 °C), Max:98.5 °F (36.9 °C)    Respirations:  Resp: 18  Pulse:   Pulse: 82  BP:    BP: 137/73  BP Range: Systolic (68XOS), YBR:211 , Min:98 , KASSANDRA:588       Diastolic (44DOO), GVO:70, Min:55, Max:73      Physical Examination:     General:  AAO x 3, speaking in full sentences, no accessory muscle use. HEENT: Atraumatic, normocephalic, no throat congestion, moist mucosa. Eyes:   Pupils equal, round and reactive to light, EOMI. Neck:   No JVD, no thyromegaly, no lymphadenopathy. Chest:  Bilateral vesicular breath sounds, no rales or wheezes. Cardiac:  S1 S2 RR, no murmurs, gallops or rubs, JVP not raised. Abdomen: Soft, non-tender, no masses or organomegaly, BS audible. :   No suprapubic or flank tenderness. Neuro:  AAO x 3, No FND. SKIN:  No rashes, good skin turgor. Extremities:  No edema, palpable peripheral pulses, no calf tenderness.     Labs:       Recent Labs     12/31/20  0544 01/01/21  0557   WBC 6.3 5.0   RBC 3.62* 3.43*   HGB 10.7* 10.2*   HCT 36.7 32.1*   .4* 93.4   MCH 29.6 29.8   MCHC 29.2* 31.9   RDW 16.6* 16.3*    211   MPV 9.3 7.7      BMP:   Recent Labs     12/31/20  0544 01/01/21  0557    144   K 4.8 4.5   * 112*   CO2 19* 20   BUN 59* 46*   CREATININE 3.00* 2.27*   GLUCOSE 83 86   CALCIUM 9.2 9.2      Phosphorus:     Recent Labs     12/31/20  0544   PHOS 4.3     Magnesium:    Recent Labs     12/31/20  0544 01/01/21  0557   MG 2.3 2.2     Albumin:    Recent Labs     12/31/20  0544   LABALBU 3.1*     BNP:      Lab Results   Component Value Date    BNP 2,330 03/21/2017     JOON:    No results found for: JOON  SPEP:  Lab Results   Component Value Date    PROT 6.1 12/31/2020     UPEP:   No results found for: LABPE  C3:   No results found for: C3  C4:   No results found for: C4  MPO ANCA:   No results found for: MPO  PR3 ANCA:   No results found for: PR3  Anti-GBM:   No results found for: GBMABIGG  Hep BsAg:       No results found for: HEPBSAG  Hep C AB:        No results found for: HEPCAB    Urinalysis/Chemistries:    No results found for: NITRU, COLORU, PHUR, LABCAST, WBCUA, RBCUA, MUCUS, TRICHOMONAS, YEAST, BACTERIA, CLARITYU, SPECGRAV, LEUKOCYTESUR, UROBILINOGEN, BILIRUBINUR, BLOODU, GLUCOSEU, KETUA, AMORPHOUS  Urine Sodium:     Lab Results   Component Value Date    CORINE 46 12/31/2020     Urine Potassium:  No results found for: KUR  Urine Chloride:  No results found for: CLUR  Urine Osmolarity: No results found for: OSMOU  Urine Protein:   No components found for: TOTALPROTEIN, URINE   Urine Creatinine:     Lab Results   Component Value Date    LABCREA 129.6 12/31/2020     Urine Eosinophils:  No components found for: UEOS    Radiology:     CXR:     Assessment: 1. Acute Kidney Injury: Secondary to ischemic ATN from intravascular volume depletion from insensible losses, bowel obstruction, large colostomy output after eating a bowl of chili. She developed hypotension as a result of it. Baseline one-point 82 peaked up to 3.0 now resolving down to 2.2  2. 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 Follows up with Dr. Luiz Mason. Previous work-up for immune complex and paraprotein disease negative. Has not seen Dr. Luiz Mason in about 3 years. 3.  Small bowel obstruction suspected parastomal hernia leading to presentation resolving  4. Dilated cardiomyopathy ejection fraction 25 to 30% etiology unclear, has been on Lasix at home also on Entresto. Never had left heart failure symptoms. No orthopnea no PND no shortness of breath  5. Type 2 diabetes with complications  6. [place] acidosis resolved  7. Nonpitting edema unlikely to respond to Lasix    Plan:   1. Discontinue IV fluids  2. Can discontinue ProAmatine on discharge  3. Okay to resume Entresto  4. Discontinue Lasix for now, patient does not have any symptoms either at present or in the past suggestive of left heart failure  5. We will check BMP 1 week after discharge  6. Outpatient follow-up with Dr. Luiz Mason in 3 to 4 weeks  7. Nothing else to add will sign off please call for questions    Nutrition   Please ensure that patient is on a renal diet/TF. Avoid nephrotoxic drugs/contrast exposure. We will continue to follow along with you.

## 2021-01-01 NOTE — PLAN OF CARE
Problem: Skin Integrity:  Goal: Will show no infection signs and symptoms  Description: Will show no infection signs and symptoms  1/1/2021 1050 by Sylvie Sousa RN  Outcome: Ongoing  1/1/2021 0224 by Karon Phillips RN  Outcome: Ongoing  Goal: Absence of new skin breakdown  Description: Absence of new skin breakdown  1/1/2021 1050 by Sylvie Sousa RN  Outcome: Ongoing  1/1/2021 0224 by Karon Phillips RN  Outcome: Ongoing     Problem: Falls - Risk of:  Goal: Will remain free from falls  Description: Will remain free from falls  1/1/2021 1050 by Sylvie Sousa RN  Outcome: Ongoing  1/1/2021 0224 by Karon Phillips RN  Outcome: Ongoing  Goal: Absence of physical injury  Description: Absence of physical injury  1/1/2021 1050 by Sylvie Sousa RN  Outcome: Ongoing  1/1/2021 0224 by Karon Phillips RN  Outcome: Ongoing     Problem: Infection:  Goal: Will remain free from infection  Description: Will remain free from infection  1/1/2021 1050 by Sylvie Sousa RN  Outcome: Ongoing  1/1/2021 0224 by Karon Phillips RN  Outcome: Ongoing     Problem: Safety:  Goal: Free from accidental physical injury  Description: Free from accidental physical injury  1/1/2021 1050 by Sylvie Sousa RN  Outcome: Ongoing  1/1/2021 0224 by Karon Phillips RN  Outcome: Ongoing  Goal: Free from intentional harm  Description: Free from intentional harm  1/1/2021 1050 by Sylvie Sousa RN  Outcome: Ongoing  1/1/2021 0224 by Karon Phillips RN  Outcome: Ongoing     Problem: Daily Care:  Goal: Daily care needs are met  Description: Daily care needs are met  1/1/2021 1050 by Sylvie Sousa RN  Outcome: Ongoing  1/1/2021 0224 by Karon Phillips RN  Outcome: Ongoing     Problem: Pain:  Goal: Patient's pain/discomfort is manageable  Description: Patient's pain/discomfort is manageable  1/1/2021 1050 by Sylvie Sousa RN  Outcome: Ongoing  1/1/2021 0224 by Karon Phillips RN Outcome: Ongoing     Problem: Health Behavior:  Goal: Ability to identify and utilize available resources and services will improve  Description: Ability to identify and utilize available resources and services will improve  1/1/2021 1050 by Leilani Patel RN  Outcome: Ongoing  1/1/2021 0224 by Damaso Finch RN  Outcome: Ongoing  Goal: Ability to manage health-related needs will improve  Description: Ability to manage health-related needs will improve  1/1/2021 1050 by Leilani Patel RN  Outcome: Ongoing  1/1/2021 0224 by Damaso Finch RN  Outcome: Ongoing     Problem: OXYGENATION/RESPIRATORY FUNCTION  Goal: Patient will maintain patent airway  1/1/2021 1050 by Leilani Patel RN  Outcome: Ongoing  1/1/2021 0224 by Damaso Finch RN  Outcome: Ongoing  Goal: Patient will achieve/maintain normal respiratory rate/effort  Description: Respiratory rate and effort will be within normal limits for the patient  1/1/2021 1050 by Leilani Patel RN  Outcome: Ongoing  1/1/2021 0224 by Damaso Finch RN  Outcome: Ongoing     Problem: Fluid Volume:  Goal: Will show no signs or symptoms of fluid imbalance  Description: Will show no signs or symptoms of fluid imbalance  1/1/2021 1050 by Leilani Patel RN  Outcome: Ongoing  1/1/2021 0224 by Damaso Finch RN  Outcome: Ongoing     Problem: Musculor/Skeletal Functional Status  Goal: Highest potential functional level  1/1/2021 1050 by Leilani Patel RN  Outcome: Ongoing  1/1/2021 0224 by Damaso Finch RN  Outcome: Ongoing  Goal: Absence of falls  1/1/2021 1050 by Leilani Patel RN  Outcome: Ongoing  1/1/2021 0224 by Damaso Finch RN  Outcome: Ongoing

## 2021-01-01 NOTE — DISCHARGE SUMMARY
[facility]  Office: [phone]  Storm Prabhakar DO, Teresa Bowling DO, Kedar Persaud DO, Vannesa Harris DO, Phyllis Manjarrez MD, Thalia Turner MD, Mian Dee MD, Karine Henriquez MD, Alejandro Ordoñez MD, Jodee Abel MD, Danny Del Angel MD, Dana Griffin MD, Mbonu Khoi Tamez MD, Phillip Bass DO, Jay Pierre MD, Isaac uDong MD, Alisa Gomez DO, Erin Eagle MD,  Clover Valencia DO, Tisha Colon MD, Tonia Best MD, [facility], [facility], CNP, Norah Poon, CNP, Jaime Breaux, CNS, Isabel Haq, CNP, Bro Jauregui, CNP, Tess Tamayo, CNP, Pete Hope, CNP, Abelardo Cevallos, CNP, Sahra Barba PA-C, Evens Carvalho, [facility], Karina Carmona, CNP, Joaquin Posadas, CNP, Paulette Longoria, CNP, Bette King, CNP, Willam Croft [id number]    Discharge Summary     Patient ID: Romaine Olmos  :  1945   MRN: [id number]     ACCOUNT:  [de-identified]   Patient's PCP: Palmer Oneill MD  Admit Date: 2020   Discharge Date: 2021   Length of Stay: 2  Code Status:  Full Code  Admitting Physician: Moe Norris MD  Discharge Physician: PAVAN Gay  CNP     Active Discharge Diagnoses:     Hospital Problem Lists:  Principal Problem:    SBO (small bowel obstruction) ([facility]  Active Problems:    H/O malignant neoplasm of colon    CHF (congestive heart failure) ([facility]    CKD (chronic kidney disease), stage IV ([facility]    Colostomy present ([facility]    Dyspnea    Essential hypertension    Hyperlipidemia    Lower urinary tract infectious disease    Lymphedema    Obesity    Type II diabetes mellitus ([facility]  Resolved Problems:    * No resolved hospital problems. *      Admission Condition:  fair     Discharged Condition: good    Hospital Stay:     Hospital Course:  Amy Margarito a 66 y.o. Unavailable/unknown female who presents with No chief complaint on file.  and is admitted to the hospital for the management of SBO (small bowel obstruction) ([address]    Patient presented to [facility] ER related to abdominal pain/cramping nausea vomiting, fever, chills, mild shortness of breath, and decreased activity has been ongoing since about Tuesday.  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  She states typically her stool is really runny but over the last couple days has been thicker but no change in color.  She also reports following with Dr. Lazaro, with nephrology but hasn't seen him since 2017.  She states when she had her initial surgery to remove the cancerous areas in her abdomen she had to have a revision surgery in the colectomy.  She is followed with Dr. Kaitlynn [place] from [place] [address] for that.  Her creatinine was initially 2.2 this morning BUN and creatinine are 59/3. 00.  I consulted the nephrology group.  BNP 11,000 379.  Hemoglobin and hematocrit are stable for her.  According to care everywhere her normal creatinine is around 2.  Her other history includes CHF with an EF reported around 25%.  CKD stage IV, dyspnea, essential hypertension, lymphedema, and type 2 diabetes.     Dr. Cayetano Goldberg was asked to see the patient related to possible small bowel obstruction per CT.  In his opinion she does not have a surgical abdomen.  Patient does not require emergent surgery but he would suggest she follow-up with Dr. Kaitlynn Colorado outpatient for evaluation of parastomal hernia.    CT completed at [facility] large right-sided parastomal hernia is present causing a partial small bowel obstruction. Lou Hoop is some stool within the small bowel in the region of the hernia.  Inflammation is present around the hernia.  This is completed at [facility] was positive for nitrates and 3+ bacteria.  Patient was started on Rocephin.  Patient denies urinary symptoms.    BUN 46 01/01/2021    CREATININE 2.27 01/01/2021    ANIONGAP 12 01/01/2021    ALKPHOS 87 12/31/2020    ALT 61 12/31/2020    AST 74 12/31/2020    BILITOT 0.33 12/31/2020    LABALBU 3.1 12/31/2020    ALBUMIN 1.0 12/31/2020    LABGLOM 21 01/01/2021    GFRAA 25 01/01/2021    GFR      01/01/2021    GFR NOT REPORTED 01/01/2021    PROT 6.1 12/31/2020    CALCIUM 9.2 01/01/2021       Radiology:  Xr Abdomen (kub) (single Ap View)    Result Date: 1/1/2021  Slightly improved appearance of mildly distended loops of bowel in the mid right abdomen. No convincing evidence of bowel obstruction at this time. Xr Abdomen (kub) (single Ap View)    Result Date: 12/31/2020  Few dilated loops of small bowel in the mid right abdomen with relative paucity of bowel gas. Underlying ileus or obstruction should be considered in the appropriate clinical setting. Consider further evaluation with CT imaging. Us Retroperitoneal Complete    Result Date: 12/31/2020  No hydronephrosis or ultrasound evidence medical renal disease. Smallish kidneys bilaterally. Unremarkable urinary bladder. Consultations:    Consults:     Final Specialist Recommendations/Findings:   IP CONSULT TO GENERAL SURGERY  IP CONSULT TO NEPHROLOGY      The patient was seen and examined on day of discharge and this discharge summary is in conjunction with any daily progress note from day of discharge.     Discharge plan:     Disposition: Home    Physician Follow Up:     Jason Mccauley MD  [facility] [address]      follow up in 3-4 weeks    Deepak Patterson MD  [address] [place] [id number] [phone]      hospital follow up in 7-10 days    [facility]                 Diet: cardiac diet, diabetic diet and low fat, low cholesterol diet    Activity: As tolerated Instructions to Patient: Call 911 or return to the ER if you experience a recurrence of your symptoms or start having fever, chills, chest pain or shortness of breath. Discharge Medications:      Medication List      START taking these medications    cefUROXime 250 MG tablet  Commonly known as: Ceftin  Take 1 tablet by mouth 2 times daily for 5 days     * midodrine 2.5 MG tablet  Commonly known as: PROAMATINE  Take 1 tablet by mouth 3 times daily (with meals)     * midodrine 2.5 MG tablet  Commonly known as: PROAMATINE  Take 1 tablet by mouth 3 times daily         * This list has 2 medication(s) that are the same as other medications prescribed for you. Read the directions carefully, and ask your doctor or other care provider to review them with you.             CONTINUE taking these medications    acetaminophen 500 MG tablet  Commonly known as: TYLENOL     aspirin 81 MG tablet     atorvastatin 40 MG tablet  Commonly known as: LIPITOR     buPROPion 150 MG extended release tablet  Commonly known as: WELLBUTRIN XL     carvedilol 3.125 MG tablet  Commonly known as: COREG     Entresto 24-26 MG per tablet  Generic drug: sacubitril-valsartan     Ferrous Fumarate 325 (106 Fe) MG Tabs     FLUoxetine 10 MG capsule  Commonly known as: PROZAC     glimepiride 2 MG tablet  Commonly known as: AMARYL        STOP taking these medications    furosemide 20 MG tablet  Commonly known as: LASIX           Where to Get Your Medications      These medications were sent to [address] [place] [address] [phone] - F [phone]  [address] [place] [address]    Phone: [phone]   · midodrine 2.5 MG tablet     These medications were sent to [address] [place] [address] [phone] - F [phone]  [address] [place] [address], [address] [place] [id number]    Phone: [phone]   · cefUROXime 250 MG tablet

## 2021-01-01 NOTE — PROGRESS NOTES
Occupational Therapy  Facility/Department: [facility] SURG ICU  Daily Treatment Note  NAME: Elma Canales  : 1945  MRN: [id number]    Date of Service: 2021    Discharge Recommendations:  Patient would benefit from continued therapy after discharge     Assessment   OT Education: OT Role;Plan of Care;ADL Adaptive Strategies;Transfer Training;Equipment  Patient Education: safety awareness, proper positioning - good return  Activity Tolerance  Activity Tolerance: Patient Tolerated treatment well  Safety Devices  Safety Devices in place: Yes  Type of devices: Call light within reach; Chair alarm in place; Left in chair;Nurse notified; Patient at risk for falls  Restraints  Initially in place: No       Patient Diagnosis(es): The encounter diagnosis was CKD (chronic kidney disease), stage IV ([facility] has a past medical history of Abdominal pain, unspecified site, Back pain, Cancer ([facility] Chronic kidney disease, Diabetes ([facility] Edema, Hypertension, Nocturia, and SOB (shortness of breath). has a past surgical history that includes Colonoscopy and Upper gastrointestinal endoscopy. Restrictions  Restrictions/Precautions  Restrictions/Precautions: General Precautions, Up as Tolerated  Required Braces or Orthoses?: No  Implants present? : (pt denies)  Position Activity Restriction  Other position/activity restrictions: up as tolerated    Subjective   General  Chart Reviewed: Orders, Progress Notes, History and Physical  Patient assessed for rehabilitation services?: Yes  Family / Caregiver Present: No  Referring Practitioner: PAVAN Alanis CNP  Diagnosis: SBO  Subjective  Subjective: \"my back is so stiff from being in the bed\"  General Comment  Comments: RN ok'd pt for therapy this date. Pt agreeable to therapy and pleasant/cooperative throughout.   Vital Signs  Patient Currently in Pain: Denies     Orientation  Orientation  Overall Orientation Status: Within Functional Limits    Objective    ADL Grooming: Stand by assistance(seated for hand and facial hygiene)  UE Bathing: Minimal assistance(sponge bath seated sinkside)  LE Bathing: Minimal assistance; Increased time to complete(sponge bath seated sinkside)  UE Dressing: Stand by assistance(to doff gown, don pullover sweater)  LE Dressing: Moderate assistance(to don sweatpants, assist to thread legs and feet)  Toileting: Minimal assistance(for transfer, clothing mgmt, hygiene)     Balance  Sitting Balance: Stand by assistance(seated for bathing, dressing, toileting tasks ~20 min total)  Standing Balance: Contact guard assistance(standing for bathing, dressing, toileting tasks ~8 min total)  Functional Mobility  Functional - Mobility Device: Rolling Walker  Activity: To/from bathroom; Other(within hospital room)  Assist Level: Contact guard assistance  Functional Mobility Comments: min unsteadiness observed, no LOB; pt required min VCs for safety awareness, proper hand postion throughout  Toilet Transfers  Toilet - Technique: Ambulating  Equipment Used: Grab bars  Toilet Transfer: Contact guard assistance  Bed mobility  Comment: Not assessed - pt seated in recliner upon arrival, returned to seated in recliner upon exit.   Transfers  Sit to stand: Contact guard assistance  Stand to sit: Contact guard assistance  Transfer Comments: to/from recliner with use of RW; pt required min VCs for safety awareness, proper hand postion throughout     Cognition  Overall Cognitive Status: [facility]     Plan   Plan  Times per week: 5-6x/wk  Current Treatment Recommendations: Strengthening, Endurance Training, Patient/Caregiver Education & Training, Equipment Evaluation, Education, & procurement, Self-Care / ADL, Balance Training, Functional Mobility Training, Safety Education & Training    Goals  Short term goals  Time Frame for Short term goals: in 14 visits, patient will Short term goal 1: perform functional transfers/mobility during ADL routine with Modified Andrews using rolling walker with Good safety  Short term goal 2: increase standing tolerance to 4-5 minutes with UE support as needed during functional task  Short term goal 3: perform BADL tasks with independence/modified independence     Therapy Time   Individual Concurrent Group Co-treatment   Time In 1110         Time Out 1138         Minutes 28         Timed Code Treatment Minutes: 26 Minutes     Abram Gonzalez, OTR/L